# Patient Record
Sex: FEMALE | Race: WHITE | Employment: UNEMPLOYED | URBAN - METROPOLITAN AREA
[De-identification: names, ages, dates, MRNs, and addresses within clinical notes are randomized per-mention and may not be internally consistent; named-entity substitution may affect disease eponyms.]

---

## 2017-02-02 RX ORDER — ALBUTEROL SULFATE 90 UG/1
AEROSOL, METERED RESPIRATORY (INHALATION)
Qty: 18 INHALER | Refills: 2
Start: 2017-02-02

## 2017-02-09 RX ORDER — ALBUTEROL SULFATE 90 UG/1
AEROSOL, METERED RESPIRATORY (INHALATION)
Qty: 18 INHALER | Refills: 2 | Status: SHIPPED | OUTPATIENT
Start: 2017-02-09 | End: 2017-08-02 | Stop reason: SDUPTHER

## 2017-02-27 RX ORDER — CLONAZEPAM 0.5 MG/1
TABLET ORAL
Qty: 30 TAB | Refills: 0 | OUTPATIENT
Start: 2017-02-27 | End: 2017-03-28 | Stop reason: SDUPTHER

## 2017-02-27 NOTE — TELEPHONE ENCOUNTER
Patient is requesting a early refill because she is going out of town. [facility] would like a call to approve.

## 2017-02-28 ENCOUNTER — TELEPHONE (OUTPATIENT)
Dept: INTERNAL MEDICINE CLINIC | Age: 57
End: 2017-02-28

## 2017-03-28 RX ORDER — CLONAZEPAM 0.5 MG/1
TABLET ORAL
Qty: 30 TAB | Refills: 0 | OUTPATIENT
Start: 2017-03-28 | End: 2017-05-09 | Stop reason: SDUPTHER

## 2017-04-03 ENCOUNTER — OFFICE VISIT (OUTPATIENT)
Dept: INTERNAL MEDICINE CLINIC | Age: 57
End: 2017-04-03

## 2017-04-03 VITALS
BODY MASS INDEX: 27.16 KG/M2 | SYSTOLIC BLOOD PRESSURE: 144 MMHG | HEIGHT: 66 IN | RESPIRATION RATE: 18 BRPM | DIASTOLIC BLOOD PRESSURE: 78 MMHG | TEMPERATURE: 99 F | OXYGEN SATURATION: 99 % | WEIGHT: 169 LBS | HEART RATE: 57 BPM

## 2017-04-03 DIAGNOSIS — E03.9 ACQUIRED HYPOTHYROIDISM: ICD-10-CM

## 2017-04-03 DIAGNOSIS — F32.A ANXIETY AND DEPRESSION: Primary | ICD-10-CM

## 2017-04-03 DIAGNOSIS — R03.0 ELEVATED BP WITHOUT DIAGNOSIS OF HYPERTENSION: ICD-10-CM

## 2017-04-03 DIAGNOSIS — F41.9 ANXIETY AND DEPRESSION: Primary | ICD-10-CM

## 2017-04-03 DIAGNOSIS — F32.89 OTHER DEPRESSION: ICD-10-CM

## 2017-04-03 RX ORDER — HYDROCHLOROTHIAZIDE 25 MG/1
25 TABLET ORAL DAILY
Qty: 30 TAB | Refills: 1 | Status: SHIPPED | OUTPATIENT
Start: 2017-04-03 | End: 2017-05-09 | Stop reason: ALTCHOICE

## 2017-04-03 RX ORDER — ESCITALOPRAM OXALATE 10 MG/1
TABLET ORAL
Qty: 30 TAB | Refills: 5 | Status: SHIPPED | OUTPATIENT
Start: 2017-04-03 | End: 2017-05-09 | Stop reason: DRUGHIGH

## 2017-04-03 NOTE — PROGRESS NOTES
HISTORY OF PRESENT ILLNESS  Jes Arevalo is a 60 y.o. female. HPI  Follow up. Issues:  1. Anxiety/depression. She is supposed to be on Lexapro 10 mg a day, but she should have run out of pills in February, so it looks as if she is missing some. She's using Klonopin, on average four per night. She did not bring her bottle and is asked to do so in the future. Her  is reviewed and she has only filled meds from me and testosterone from [employer]. She goes through roughly 30 Klonopin every month and a half. 2. She is on a pork based thyroid supplement, but tells me it has not been checked recently. 3. I am concerned about her blood pressure as it is consistently running elevated in the diastolic and she describes some fluid retention, as well as tingling at times in right hand. No chest pain or shortness of breath. Review of Systems   Constitutional: Negative for chills, fever and weight loss. Respiratory: Negative for cough, shortness of breath and wheezing. Cardiovascular: Positive for leg swelling. Negative for chest pain, palpitations, orthopnea and PND. Gastrointestinal: Negative for heartburn and nausea. Musculoskeletal: Negative for myalgias. Neurological: Negative for dizziness and headaches. Psychiatric/Behavioral: Negative for depression. The patient has insomnia. The patient is not nervous/anxious. Physical Exam   Constitutional: She is oriented to person, place, and time. She appears well-developed and well-nourished. HENT:   Head: Normocephalic and atraumatic. Neck: Normal range of motion. Neck supple. Carotid bruit is not present. No thyromegaly present. Cardiovascular: Normal rate, regular rhythm, S1 normal, S2 normal, normal heart sounds and intact distal pulses. No murmur heard. Pulmonary/Chest: Effort normal and breath sounds normal. No respiratory distress. She has no wheezes. She has no rales. Musculoskeletal: She exhibits no edema. Neurological: She is alert and oriented to person, place, and time. Psychiatric: She has a normal mood and affect. Her behavior is normal.   Nursing note and vitals reviewed. ASSESSMENT and PLAN  Michael Ribera was seen today for medication evaluation. Diagnoses and all orders for this visit:    Anxiety and depression    Elevated BP without diagnosis of hypertension-start the hctz and appt in 1mo   Side effects possible reviewed in detail'  -     METABOLIC PANEL, COMPREHENSIVE    Other depression  -     escitalopram oxalate (LEXAPRO) 10 mg tablet; TAKE 1 TABLET BY MOUTH EVERY DAY-advised to take the lexapro qd    Acquired hypothyroidism  -     TSH 3RD GENERATION  -     T4, FREE  -     CBC WITH AUTOMATED DIFF    Other orders  -     hydroCHLOROthiazide (HYDRODIURIL) 25 mg tablet; Take 1 Tab by mouth daily.

## 2017-04-03 NOTE — MR AVS SNAPSHOT
Visit Information Date & Time Provider Department Dept. Phone Encounter #  
 4/3/2017  1:00 PM Juli Quintana MD [facility] [address] [id number] Upcoming Health Maintenance Date Due Hepatitis C Screening 1960 Pneumococcal 19-64 Medium Risk (1 of 1 - PPSV23) 12/11/1977 INFLUENZA AGE 9 TO ADULT 8/1/2016 PAP AKA CERVICAL CYTOLOGY 7/22/2017 BREAST CANCER SCRN MAMMOGRAM 9/27/2018 COLONOSCOPY 5/19/2019 DTaP/Tdap/Td series (2 - Td) 2/18/2026 Allergies as of 4/3/2017  Review Complete On: 4/3/2017 By: Juli Quintana MD  
  
 Severity Noted Reaction Type Reactions Pollen Extracts  08/07/2014    Sneezing Current Immunizations  Reviewed on 10/4/2016 Name Date Tdap 2/18/2016 Not reviewed this visit You Were Diagnosed With   
  
 Codes Comments Anxiety and depression    -  Primary ICD-10-CM: F41.9, F32.9 ICD-9-CM: 300.00, 311 Elevated BP without diagnosis of hypertension     ICD-10-CM: R03.0 ICD-9-CM: 796.2 Other depression     ICD-10-CM: F32.89 ICD-9-CM: 097 Acquired hypothyroidism     ICD-10-CM: E03.9 ICD-9-CM: 531. 9 Vitals BP Pulse Temp Resp Height(growth percentile) Weight(growth percentile) 144/78 (BP 1 Location: Left arm, BP Patient Position: Sitting) (!) 57 99 °F (37.2 °C) (Oral) 18 5' 6\" (1.676 m) 169 lb (76.7 kg) SpO2 BMI OB Status Smoking Status 99% 27.28 kg/m2 Menopause Never Smoker Vitals History BMI and BSA Data Body Mass Index Body Surface Area  
 27.28 kg/m 2 1.89 m 2 Preferred Pharmacy Pharmacy Name Phone [id number] [address], [address] [place] [place] [address]  [phone] Your Updated Medication List  
  
   
This list is accurate as of: 4/3/17  1:32 PM.  Always use your most recent med list.  
  
  
  
  
 clonazePAM 0.5 mg tablet Commonly known as:  KlonoPIN  
TAKE 1 TABLET BY MOUTH AT BEDTIME AS NEEDED  
  
 escitalopram oxalate 10 mg tablet Commonly known as:  Aliene Apo TAKE 1 TABLET BY MOUTH EVERY DAY  
  
 fexofenadine 180 mg tablet Commonly known as:  Girdler Fears Take 1 Tab by mouth daily. Indications: ALLERGIC CONJUNCTIVITIS  
  
 fluticasone-salmeterol 250-50 mcg/dose diskus inhaler Commonly known as:  ADVAIR Take 1 Puff by inhalation two (2) times a day. hydroCHLOROthiazide 25 mg tablet Commonly known as:  HYDRODIURIL Take 1 Tab by mouth daily. metFORMIN  mg tablet Commonly known as:  GLUCOPHAGE XR Take 1,000 mg by mouth two (2) times a day. montelukast 10 mg tablet Commonly known as:  SINGULAIR  
TAKE 1 TABLET BY MOUTH AT BEDTIME  
  
 OTHER  
biest (estriol/estradial 80/20%) progesterone 100 mg capsule Commonly known as:  PROMETRIUM Take 100 mg by mouth daily. Progesterone/pregnenolone E4M 100/50mg TESTOSTERONE CREAM  
Apply  to affected area. Testosterone Cream 1.25mg  
  
 thyroid (Pork) 32.5 mg tablet Commonly known as:  NATURE-THROID/WESTHROID Take  by mouth daily. VENTOLIN HFA 90 mcg/actuation inhaler Generic drug:  albuterol TAKE 1 TO 2 PUFFS EVERY 4 TO 6 HOURS AS NEEDED Prescriptions Sent to Pharmacy Refills  
 hydroCHLOROthiazide (HYDRODIURIL) 25 mg tablet 1 Sig: Take 1 Tab by mouth daily. Class: Normal  
 Pharmacy: [facility] #[id number]- [address] Ph #: [phone] Route: Oral  
 escitalopram oxalate (LEXAPRO) 10 mg tablet 5 Sig: TAKE 1 TABLET BY MOUTH EVERY DAY Class: Normal  
 Pharmacy: [facility]/pharm[facility] #[id number]- [address] Ph #: [phone] We Performed the Following CBC WITH AUTOMATED DIFF [53246 CPT(R)] METABOLIC PANEL, COMPREHENSIVE [07088 CPT(R)] T4, FREE [id number] CPT(R)] TSH 3RD GENERATION [14010 CPT(R)] Introducing [facility]! Dear Marichuy Bond: Thank you for requesting a [employer] account.   Our records indicate that you already have an active [employer] account. You can access your account anytime at [email or web address] Did you know that you can access your hospital and ER discharge instructions at any time in [employer]? You can also review all of your test results from your hospital stay or ER visit. Additional Information If you have questions, please visit the Frequently Asked Questions section of the [employer] website at [email or web address] Remember, [employer] is NOT to be used for urgent needs. For medical emergencies, dial 911. Now available from your iPhone and Android! Please provide this summary of care documentation to your next provider. Your primary care clinician is listed as Suzi Depmsey. If you have any questions after today's visit, please call [phone].

## 2017-04-04 LAB
ALBUMIN SERPL-MCNC: 4.4 G/DL (ref 3.5–5.5)
ALBUMIN/GLOB SERPL: 2.1 {RATIO} (ref 1.2–2.2)
ALP SERPL-CCNC: 33 IU/L (ref 39–117)
ALT SERPL-CCNC: 13 IU/L (ref 0–32)
AST SERPL-CCNC: 17 IU/L (ref 0–40)
BASOPHILS # BLD AUTO: 0 X10E3/UL (ref 0–0.2)
BASOPHILS NFR BLD AUTO: 1 %
BILIRUB SERPL-MCNC: 0.4 MG/DL (ref 0–1.2)
BUN SERPL-MCNC: 18 MG/DL (ref 6–24)
BUN/CREAT SERPL: 31 (ref 9–23)
CALCIUM SERPL-MCNC: 9.9 MG/DL (ref 8.7–10.2)
CHLORIDE SERPL-SCNC: 99 MMOL/L (ref 96–106)
CO2 SERPL-SCNC: 26 MMOL/L (ref 18–29)
CREAT SERPL-MCNC: 0.59 MG/DL (ref 0.57–1)
EOSINOPHIL # BLD AUTO: 0.1 X10E3/UL (ref 0–0.4)
EOSINOPHIL NFR BLD AUTO: 2 %
ERYTHROCYTE [DISTWIDTH] IN BLOOD BY AUTOMATED COUNT: 14 % (ref 12.3–15.4)
GLOBULIN SER CALC-MCNC: 2.1 G/DL (ref 1.5–4.5)
GLUCOSE SERPL-MCNC: 88 MG/DL (ref 65–99)
HCT VFR BLD AUTO: 39.6 % (ref 34–46.6)
HGB BLD-MCNC: 13.2 G/DL (ref 11.1–15.9)
IMM GRANULOCYTES # BLD: 0 X10E3/UL (ref 0–0.1)
IMM GRANULOCYTES NFR BLD: 0 %
LYMPHOCYTES # BLD AUTO: 1.6 X10E3/UL (ref 0.7–3.1)
LYMPHOCYTES NFR BLD AUTO: 28 %
MCH RBC QN AUTO: 31.3 PG (ref 26.6–33)
MCHC RBC AUTO-ENTMCNC: 33.3 G/DL (ref 31.5–35.7)
MCV RBC AUTO: 94 FL (ref 79–97)
MONOCYTES # BLD AUTO: 0.4 X10E3/UL (ref 0.1–0.9)
MONOCYTES NFR BLD AUTO: 8 %
NEUTROPHILS # BLD AUTO: 3.4 X10E3/UL (ref 1.4–7)
NEUTROPHILS NFR BLD AUTO: 61 %
PLATELET # BLD AUTO: 262 X10E3/UL (ref 150–379)
POTASSIUM SERPL-SCNC: 4.7 MMOL/L (ref 3.5–5.2)
PROT SERPL-MCNC: 6.5 G/DL (ref 6–8.5)
RBC # BLD AUTO: 4.22 X10E6/UL (ref 3.77–5.28)
SODIUM SERPL-SCNC: 140 MMOL/L (ref 134–144)
T4 FREE SERPL-MCNC: 0.82 NG/DL (ref 0.82–1.77)
TSH SERPL DL<=0.005 MIU/L-ACNC: 0.59 UIU/ML (ref 0.45–4.5)
WBC # BLD AUTO: 5.5 X10E3/UL (ref 3.4–10.8)

## 2017-04-08 RX ORDER — ESCITALOPRAM OXALATE 10 MG/1
TABLET ORAL
Qty: 30 TAB | Refills: 5 | Status: SHIPPED | OUTPATIENT
Start: 2017-04-08 | End: 2018-02-06 | Stop reason: ALTCHOICE

## 2017-05-09 ENCOUNTER — OFFICE VISIT (OUTPATIENT)
Dept: INTERNAL MEDICINE CLINIC | Age: 57
End: 2017-05-09

## 2017-05-09 VITALS
WEIGHT: 168.8 LBS | HEIGHT: 66 IN | HEART RATE: 60 BPM | TEMPERATURE: 99.2 F | BODY MASS INDEX: 27.13 KG/M2 | OXYGEN SATURATION: 98 % | DIASTOLIC BLOOD PRESSURE: 84 MMHG | SYSTOLIC BLOOD PRESSURE: 149 MMHG | RESPIRATION RATE: 16 BRPM

## 2017-05-09 DIAGNOSIS — G25.81 RLS (RESTLESS LEGS SYNDROME): ICD-10-CM

## 2017-05-09 DIAGNOSIS — F32.A ANXIETY AND DEPRESSION: ICD-10-CM

## 2017-05-09 DIAGNOSIS — F41.9 ANXIETY AND DEPRESSION: ICD-10-CM

## 2017-05-09 DIAGNOSIS — I10 HTN, GOAL BELOW 130/80: Primary | ICD-10-CM

## 2017-05-09 DIAGNOSIS — E04.0 GOITER DIFFUSE: ICD-10-CM

## 2017-05-09 RX ORDER — CLONAZEPAM 0.5 MG/1
TABLET ORAL
Qty: 30 TAB | Refills: 1 | Status: SHIPPED | OUTPATIENT
Start: 2017-05-09 | End: 2017-07-14 | Stop reason: SDUPTHER

## 2017-05-09 RX ORDER — ESCITALOPRAM OXALATE 20 MG/1
20 TABLET ORAL DAILY
Qty: 30 TAB | Refills: 2 | Status: SHIPPED | OUTPATIENT
Start: 2017-05-09 | End: 2017-07-14 | Stop reason: SDUPTHER

## 2017-05-09 RX ORDER — VALSARTAN AND HYDROCHLOROTHIAZIDE 80; 12.5 MG/1; MG/1
1 TABLET, FILM COATED ORAL DAILY
Qty: 30 TAB | Refills: 5 | Status: SHIPPED | OUTPATIENT
Start: 2017-05-09 | End: 2017-10-21 | Stop reason: SDUPTHER

## 2017-05-09 RX ORDER — VALACYCLOVIR HYDROCHLORIDE 1 G/1
1 TABLET, FILM COATED ORAL AS NEEDED
Refills: 6 | COMMUNITY
Start: 2017-04-07

## 2017-05-09 NOTE — MR AVS SNAPSHOT
Visit Information Date & Time Provider Department Dept. Phone Encounter #  
 5/9/2017  1:15 PM Aureliano Earl MD [facility] [address] [id number] Upcoming Health Maintenance Date Due Hepatitis C Screening 1960 Pneumococcal 19-64 Medium Risk (1 of 1 - PPSV23) 12/11/1977 PAP AKA CERVICAL CYTOLOGY 7/22/2017 INFLUENZA AGE 9 TO ADULT 8/1/2017 BREAST CANCER SCRN MAMMOGRAM 9/27/2018 COLONOSCOPY 5/19/2019 DTaP/Tdap/Td series (2 - Td) 2/18/2026 Allergies as of 5/9/2017  Review Complete On: 5/9/2017 By: Aureliano Earl MD  
  
 Severity Noted Reaction Type Reactions Pollen Extracts  08/07/2014    Sneezing Current Immunizations  Reviewed on 10/4/2016 Name Date Tdap 2/18/2016 Not reviewed this visit You Were Diagnosed With   
  
 Codes Comments HTN, goal below 130/80    -  Primary ICD-10-CM: I10 
ICD-9-CM: 401.9 Anxiety and depression     ICD-10-CM: F41.9, F32.9 ICD-9-CM: 300.00, 311 RLS (restless legs syndrome)     ICD-10-CM: G25.81 ICD-9-CM: 333.94 Vitals BP Pulse Temp Resp Height(growth percentile) Weight(growth percentile) 149/84 (BP 1 Location: Left arm, BP Patient Position: Sitting) 60 99.2 °F (37.3 °C) (Oral) 16 5' 6\" (1.676 m) 168 lb 12.8 oz (76.6 kg) SpO2 BMI OB Status Smoking Status 98% 27.25 kg/m2 Menopause Never Smoker Vitals History BMI and BSA Data Body Mass Index Body Surface Area  
 27.25 kg/m 2 1.89 m 2 Preferred Pharmacy Pharmacy Name Phone [id number] [address] , [address] [place] [address]  [phone] Your Updated Medication List  
  
   
This list is accurate as of: 5/9/17  1:42 PM.  Always use your most recent med list.  
  
  
  
  
 clonazePAM 0.5 mg tablet Commonly known as:  KlonoPIN  
TAKE 1 TABLET BY MOUTH AT BEDTIME AS NEEDED * escitalopram oxalate 10 mg tablet Commonly known as:  Zack Castellanos TAKE 1 TABLET BY MOUTH EVERY DAY **MAKE APPT  
  
 * escitalopram oxalate 20 mg tablet Commonly known as:  Cintia Peak Take 1 Tab by mouth daily. fexofenadine 180 mg tablet Commonly known as:  Jannet Velasquez Take 1 Tab by mouth daily. Indications: ALLERGIC CONJUNCTIVITIS  
  
 fluticasone-salmeterol 250-50 mcg/dose diskus inhaler Commonly known as:  ADVAIR Take 1 Puff by inhalation two (2) times a day. metFORMIN  mg tablet Commonly known as:  GLUCOPHAGE XR Take 1,000 mg by mouth two (2) times a day. montelukast 10 mg tablet Commonly known as:  SINGULAIR  
TAKE 1 TABLET BY MOUTH AT BEDTIME  
  
 OTHER  
biest (estriol/estradial 80/20%) progesterone 100 mg capsule Commonly known as:  PROMETRIUM Take 100 mg by mouth daily. Progesterone/pregnenolone E4M 100/50mg TESTOSTERONE CREAM  
Apply  to affected area. Testosterone Cream 1.25mg  
  
 thyroid (Pork) 32.5 mg tablet Commonly known as:  NATURE-THROID/WESTHROID Take  by mouth daily. valACYclovir 1 gram tablet Commonly known as:  VALTREX Take 1 Tab by mouth as needed. Used for cold sores. valsartan-hydroCHLOROthiazide 80-12.5 mg per tablet Commonly known as:  DIOVAN-HCT Take 1 Tab by mouth daily. VENTOLIN HFA 90 mcg/actuation inhaler Generic drug:  albuterol TAKE 1 TO 2 PUFFS EVERY 4 TO 6 HOURS AS NEEDED * Notice: This list has 2 medication(s) that are the same as other medications prescribed for you. Read the directions carefully, and ask your doctor or other care provider to review them with you. Prescriptions Printed Refills  
 clonazePAM (KLONOPIN) 0.5 mg tablet 1 Sig: TAKE 1 TABLET BY MOUTH AT BEDTIME AS NEEDED Class: Print Prescriptions Sent to Pharmacy Refills  
 escitalopram oxalate (LEXAPRO) 20 mg tablet 2 Sig: Take 1 Tab by mouth daily.   
 Class: Normal  
 Pharmacy: [facility] #[id number]- [address] Ph #: [phone] Route: Oral  
 valsartan-hydroCHLOROthiazide (DIOVAN-HCT) 80-12.5 mg per tablet 5 Sig: Take 1 Tab by mouth daily. Class: Normal  
 Pharmacy: [facility]/pharmacy #[id number]- [address], [address] [place] [address] [address] #: [phone] Route: Oral  
  
Introducing [facility]! Dear Negrita Moreno: Thank you for requesting a [employer] account. Our records indicate that you already have an active [employer] account. You can access your account anytime at [email or web address] Did you know that you can access your hospital and ER discharge instructions at any time in [employer]? You can also review all of your test results from your hospital stay or ER visit. Additional Information If you have questions, please visit the Frequently Asked Questions section of the [employer] website at [email or web address] Remember, [employer] is NOT to be used for urgent needs. For medical emergencies, dial 911. Now available from your iPhone and Android! Please provide this summary of care documentation to your next provider. Your primary care clinician is listed as Suzi 68. If you have any questions after today's visit, please call [phone].

## 2017-05-09 NOTE — PROGRESS NOTES
HISTORY OF PRESENT ILLNESS  Lee Madrid is a 60 y.o. female. HPI  Saul Best is seen at follow up. Issues:  1. Hypertension. Tolerates HCTZ, but blood pressure is still running high. Will change to Diovan/HCTZ. 2. Anxiety with some restless legs at night. Using Klonopin. Last prescription lasted six weeks of the 0.5. We are going to bump Lexapro to 20 to see if she can continue to taper Klonopin. 3. Thyroid goiter. No trouble with swallowing or hoarse voice. Thyroid function was normal.  Will check an ultrasound. Review of Systems   Constitutional: Negative for chills, fever and weight loss. Respiratory: Negative for cough, shortness of breath and wheezing. Cardiovascular: Negative for chest pain, palpitations, orthopnea, leg swelling and PND. Gastrointestinal: Negative for abdominal pain, constipation, heartburn and nausea. Musculoskeletal: Negative for myalgias. Neurological: Negative for dizziness and headaches. Physical Exam   Constitutional: She is oriented to person, place, and time. She appears well-developed and well-nourished. HENT:   Head: Normocephalic and atraumatic. Neck: Normal range of motion. Neck supple. Carotid bruit is not present. Thyromegaly present. Cardiovascular: Normal rate, regular rhythm, S1 normal, S2 normal, normal heart sounds and intact distal pulses. No murmur heard. Pulmonary/Chest: Effort normal and breath sounds normal. No respiratory distress. She has no wheezes. She has no rales. Musculoskeletal: She exhibits no edema. Neurological: She is alert and oriented to person, place, and time. Psychiatric: She has a normal mood and affect. Her behavior is normal.   Nursing note and vitals reviewed. ASSESSMENT and PLAN  Saul Best was seen today for results. Diagnoses and all orders for this visit:    HTN, goal below 130/80  -     valsartan-hydroCHLOROthiazide (DIOVAN-HCT) 80-12.5 mg per tablet;  Take 1 Tab by mouth daily. Anxiety and depression  -     escitalopram oxalate (LEXAPRO) 20 mg tablet; Take 1 Tab by mouth daily.     RLS (restless legs syndrome)    Goiter diffuse  -     US THYROID/PARATHYROID/SOFT TISS; Future    Other orders  -     clonazePAM (KLONOPIN) 0.5 mg tablet; TAKE 1 TABLET BY MOUTH AT BEDTIME AS NEEDED      the following changes in treatment are made: change to diovan hctz for bp and inc lexapro to 20 mg and aptp in 2.5 mo   reviewed

## 2017-05-28 RX ORDER — HYDROCHLOROTHIAZIDE 25 MG/1
TABLET ORAL
Qty: 30 TAB | Refills: 1 | OUTPATIENT
Start: 2017-05-28

## 2017-05-31 RX ORDER — HYDROCHLOROTHIAZIDE 25 MG/1
TABLET ORAL
Qty: 30 TAB | Refills: 5 | OUTPATIENT
Start: 2017-05-31

## 2017-07-12 NOTE — TELEPHONE ENCOUNTER
She has made appointment to see Dr Navneet Mayo for 07/14/2017 and she needs a refill on her  clonazePAM (KLONOPIN) 0.5 mg tablet  [facility]  [phone] her no is [phone].  She is out of her medication

## 2017-07-14 ENCOUNTER — OFFICE VISIT (OUTPATIENT)
Dept: INTERNAL MEDICINE CLINIC | Age: 57
End: 2017-07-14

## 2017-07-14 VITALS
RESPIRATION RATE: 16 BRPM | DIASTOLIC BLOOD PRESSURE: 69 MMHG | HEIGHT: 66 IN | TEMPERATURE: 97.7 F | WEIGHT: 169 LBS | HEART RATE: 64 BPM | OXYGEN SATURATION: 98 % | BODY MASS INDEX: 27.16 KG/M2 | SYSTOLIC BLOOD PRESSURE: 121 MMHG

## 2017-07-14 DIAGNOSIS — F32.89 OTHER DEPRESSION: ICD-10-CM

## 2017-07-14 DIAGNOSIS — F41.9 ANXIETY AND DEPRESSION: ICD-10-CM

## 2017-07-14 DIAGNOSIS — I10 HTN, GOAL BELOW 130/80: Primary | ICD-10-CM

## 2017-07-14 DIAGNOSIS — F32.A ANXIETY AND DEPRESSION: ICD-10-CM

## 2017-07-14 DIAGNOSIS — E04.9 THYROID GOITER: ICD-10-CM

## 2017-07-14 RX ORDER — CLONAZEPAM 0.5 MG/1
TABLET ORAL
Qty: 30 TAB | Refills: 0 | Status: CANCELLED | OUTPATIENT
Start: 2017-07-14

## 2017-07-14 RX ORDER — ESCITALOPRAM OXALATE 20 MG/1
20 TABLET ORAL DAILY
Qty: 30 TAB | Refills: 2 | Status: SHIPPED | OUTPATIENT
Start: 2017-07-14 | End: 2017-12-06 | Stop reason: SDUPTHER

## 2017-07-14 RX ORDER — CLONAZEPAM 0.5 MG/1
TABLET ORAL
Qty: 30 TAB | Refills: 2 | Status: SHIPPED | OUTPATIENT
Start: 2017-08-12 | End: 2017-11-28 | Stop reason: SDUPTHER

## 2017-07-14 NOTE — PROGRESS NOTES
Chief Complaint   Patient presents with    Medication Refill       1. Have you been to the ER, urgent care clinic since your last visit? Hospitalized since your last visit? No    2. Have you seen or consulted any other health care providers outside of the [address] [place] [address] since your last visit? Include any pap smears or colon screening.  No

## 2017-07-14 NOTE — PROGRESS NOTES
HISTORY OF PRESENT ILLNESS  Jillian Hardwick is a 60 y.o. female. HPI  Follow up:  1. Hypertension. Changed from Valsartan to Valsartan/HCTZ, which she tolerates well and blood pressure looks great. No chest pain, no cramps. Recently went to [place]. 2. Depression, tolerating the increased dose of Lexapro 20. She's using Klonopin 0.5 at night.  is reviewed. Last filled July 12th. She brings her bottle in. There is no evidence of misuse or overuse. 3. Thyroid enlargement. She is on a nature thyroid replacement. She is not having trouble swallowing or having hoarse voice. She never went for her ultrasound. Review of Systems   Constitutional: Negative for chills, fever, malaise/fatigue and weight loss. Respiratory: Negative for cough, shortness of breath and wheezing. Cardiovascular: Negative for chest pain, palpitations, orthopnea, leg swelling and PND. Gastrointestinal: Negative for heartburn and nausea. Musculoskeletal: Negative for myalgias. Neurological: Negative for dizziness and headaches. Psychiatric/Behavioral: Negative for depression and substance abuse. The patient is not nervous/anxious. Physical Exam   Constitutional: She is oriented to person, place, and time. She appears well-developed and well-nourished. HENT:   Head: Normocephalic and atraumatic. Neck: Normal range of motion. Neck supple. Carotid bruit is not present. Thyromegaly present. Cardiovascular: Normal rate, regular rhythm, S1 normal, S2 normal, normal heart sounds and intact distal pulses. No murmur heard. Pulmonary/Chest: Effort normal and breath sounds normal. No respiratory distress. She has no wheezes. She has no rales. Musculoskeletal: She exhibits no edema. Neurological: She is alert and oriented to person, place, and time. Psychiatric: She has a normal mood and affect. Her behavior is normal.   Nursing note and vitals reviewed.       ASSESSMENT and PLAN  Virginia Pablo was seen today for medication refill. Diagnoses and all orders for this visit:    HTN, goal below 130/80  Improved cont diovan hctz  appt in 3mo  Other depression    Anxiety and depression  -     escitalopram oxalate (LEXAPRO) 20 mg tablet; Take 1 Tab by mouth daily.   -     clonazePAM (KLONOPIN) 0.5 mg tablet; TAKE 1 TABLET BY MOUTH AT BEDTIME AS NEEDED    Thyroid goiter  -     US THYROID/PARATHYROID/SOFT TISS; Future

## 2017-07-23 RX ORDER — ALBUTEROL SULFATE 90 UG/1
AEROSOL, METERED RESPIRATORY (INHALATION)
Qty: 18 INHALER | Refills: 2
Start: 2017-07-23

## 2017-07-31 DIAGNOSIS — F41.9 ANXIETY AND DEPRESSION: ICD-10-CM

## 2017-07-31 DIAGNOSIS — F32.A ANXIETY AND DEPRESSION: ICD-10-CM

## 2017-07-31 RX ORDER — ESCITALOPRAM OXALATE 20 MG/1
TABLET ORAL
Qty: 30 TAB | Refills: 2 | Status: SHIPPED | OUTPATIENT
Start: 2017-07-31 | End: 2018-02-27 | Stop reason: SDUPTHER

## 2017-08-02 RX ORDER — ALBUTEROL SULFATE 90 UG/1
AEROSOL, METERED RESPIRATORY (INHALATION)
Qty: 18 INHALER | Refills: 2 | Status: SHIPPED | OUTPATIENT
Start: 2017-08-02 | End: 2018-02-23 | Stop reason: SDUPTHER

## 2017-08-22 ENCOUNTER — HOSPITAL ENCOUNTER (OUTPATIENT)
Dept: ULTRASOUND IMAGING | Age: 57
Discharge: HOME | End: 2017-08-22
Attending: INTERNAL MEDICINE
Payer: COMMERCIAL

## 2017-08-22 DIAGNOSIS — E04.9 THYROID GOITER: ICD-10-CM

## 2017-08-22 PROCEDURE — 76536 US EXAM OF HEAD AND NECK: CPT

## 2017-10-21 DIAGNOSIS — I10 HTN, GOAL BELOW 130/80: ICD-10-CM

## 2017-10-21 RX ORDER — VALSARTAN AND HYDROCHLOROTHIAZIDE 80; 12.5 MG/1; MG/1
TABLET, FILM COATED ORAL
Qty: 30 TAB | Refills: 5 | Status: SHIPPED | OUTPATIENT
Start: 2017-10-21 | End: 2018-02-27 | Stop reason: SDUPTHER

## 2017-11-28 DIAGNOSIS — F41.9 ANXIETY AND DEPRESSION: ICD-10-CM

## 2017-11-28 DIAGNOSIS — F32.A ANXIETY AND DEPRESSION: ICD-10-CM

## 2017-11-28 RX ORDER — CLONAZEPAM 0.5 MG/1
TABLET ORAL
Qty: 30 TAB | Refills: 0 | OUTPATIENT
Start: 2017-11-28 | End: 2017-12-15 | Stop reason: SDUPTHER

## 2017-12-06 DIAGNOSIS — F41.9 ANXIETY AND DEPRESSION: ICD-10-CM

## 2017-12-06 DIAGNOSIS — F32.A ANXIETY AND DEPRESSION: ICD-10-CM

## 2017-12-06 RX ORDER — ESCITALOPRAM OXALATE 20 MG/1
TABLET ORAL
Qty: 30 TAB | Refills: 2 | Status: SHIPPED | OUTPATIENT
Start: 2017-12-06 | End: 2017-12-15 | Stop reason: SDUPTHER

## 2017-12-15 ENCOUNTER — OFFICE VISIT (OUTPATIENT)
Dept: INTERNAL MEDICINE CLINIC | Age: 57
End: 2017-12-15

## 2017-12-15 ENCOUNTER — HOSPITAL ENCOUNTER (OUTPATIENT)
Dept: GENERAL RADIOLOGY | Age: 57
Discharge: HOME | End: 2017-12-15
Attending: INTERNAL MEDICINE
Payer: COMMERCIAL

## 2017-12-15 VITALS
SYSTOLIC BLOOD PRESSURE: 124 MMHG | RESPIRATION RATE: 16 BRPM | TEMPERATURE: 98.9 F | BODY MASS INDEX: 27.8 KG/M2 | OXYGEN SATURATION: 98 % | WEIGHT: 173 LBS | HEIGHT: 66 IN | DIASTOLIC BLOOD PRESSURE: 74 MMHG | HEART RATE: 65 BPM

## 2017-12-15 DIAGNOSIS — M79.671 RIGHT FOOT PAIN: Primary | ICD-10-CM

## 2017-12-15 DIAGNOSIS — E03.9 ACQUIRED HYPOTHYROIDISM: ICD-10-CM

## 2017-12-15 DIAGNOSIS — F41.9 ANXIETY AND DEPRESSION: ICD-10-CM

## 2017-12-15 DIAGNOSIS — M79.671 RIGHT FOOT PAIN: ICD-10-CM

## 2017-12-15 DIAGNOSIS — F32.A ANXIETY AND DEPRESSION: ICD-10-CM

## 2017-12-15 DIAGNOSIS — N39.0 URINARY TRACT INFECTION WITHOUT HEMATURIA, SITE UNSPECIFIED: ICD-10-CM

## 2017-12-15 PROCEDURE — 73630 X-RAY EXAM OF FOOT: CPT

## 2017-12-15 RX ORDER — SULFAMETHOXAZOLE AND TRIMETHOPRIM 800; 160 MG/1; MG/1
1 TABLET ORAL 2 TIMES DAILY
Qty: 6 TAB | Refills: 0 | Status: SHIPPED | OUTPATIENT
Start: 2017-12-15 | End: 2017-12-28 | Stop reason: ALTCHOICE

## 2017-12-15 RX ORDER — CLONAZEPAM 0.5 MG/1
TABLET ORAL
Qty: 30 TAB | Refills: 1 | Status: SHIPPED | OUTPATIENT
Start: 2017-12-15 | End: 2018-02-27 | Stop reason: SDUPTHER

## 2017-12-15 RX ORDER — PHENAZOPYRIDINE HYDROCHLORIDE 200 MG/1
200 TABLET, FILM COATED ORAL
Qty: 12 TAB | Refills: 1 | Status: SHIPPED | OUTPATIENT
Start: 2017-12-15 | End: 2019-05-23 | Stop reason: SDUPTHER

## 2017-12-15 NOTE — PROGRESS NOTES
HISTORY OF PRESENT ILLNESS  Ford Elliott is a 59 y.o. female. HPI  Manisha Or has several concerns:  1. She twisted her right foot while in [place] in [place]. Xray there was negative, however continues to have swelling and pain in middle part of her foot. 2. UTI symptoms off and on for the last week with dysuria and urgency. No flank pain or fevers or chills. Will do a urine culture and she wants to start antibiotics while waiting on culture. 3. Anxiety. Brings in [place]. She filled 30 tablets on Novembver 28th and has used 21 of the tablets. Will refill this.  reviewed. She has been getting testosterone micronized powder from Dr. Jaison Muhammad. No other controlled substances recently. 4. Energy is good. She is on a topical estrogen and progesterone supplement from Dr. Ace Lyles and he is following levels for her. She is using it for libido and weight. Review of Systems   Constitutional: Negative for chills, fever and weight loss. Respiratory: Negative for cough, shortness of breath and wheezing. Cardiovascular: Negative for chest pain, palpitations, orthopnea, leg swelling and PND. Gastrointestinal: Negative for abdominal pain, constipation, heartburn, nausea and vomiting. Genitourinary: Positive for dysuria, frequency and urgency. Negative for flank pain and hematuria. Musculoskeletal: Positive for falls and joint pain (right foot). Negative for myalgias. Neurological: Negative for dizziness and headaches. Psychiatric/Behavioral: Negative for depression. Physical Exam   Constitutional: She is oriented to person, place, and time. She appears well-developed and well-nourished. HENT:   Head: Normocephalic and atraumatic. Neck: Normal range of motion. Neck supple. Carotid bruit is not present. No thyromegaly present. Cardiovascular: Normal rate, regular rhythm, S1 normal, S2 normal, normal heart sounds and intact distal pulses. No murmur heard.   Pulmonary/Chest: Effort normal and breath sounds normal. No respiratory distress. She has no wheezes. She has no rales. Abdominal: Soft. There is no tenderness. Musculoskeletal: She exhibits no edema. Mid right  and mildly swollen   Neurological: She is alert and oriented to person, place, and time. Psychiatric: She has a normal mood and affect. Her behavior is normal.   Nursing note and vitals reviewed. ASSESSMENT and PLAN  Diagnoses and all orders for this visit:    1. Right foot pain  -     XR FOOT RT MIN 3 V; Future    2. Urinary tract infection without hematuria, site unspecified  -     trimethoprim-sulfamethoxazole (BACTRIM DS, SEPTRA DS) 160-800 mg per tablet; Take 1 Tab by mouth two (2) times a day. -     CULTURE, URINE  -     phenazopyridine (PYRIDIUM) 200 mg tablet; Take 1 Tab by mouth three (3) times daily as needed for Pain for up to 3 days. 3. Acquired hypothyroidism  -     METABOLIC PANEL, COMPREHENSIVE  -     TSH 3RD GENERATION  -     T4, FREE    4.  Anxiety and depression  -     clonazePAM (KLONOPIN) 0.5 mg tablet; TAKE 1 TABLET BY MOUTH AT BEDTIME AS NEEDED     reviewed

## 2017-12-15 NOTE — MR AVS SNAPSHOT
Visit Information Date & Time Provider Department Dept. Phone Encounter #  
 12/15/2017 11:30 AM Zachary Hernandez MD [facility] [address] [id number] Upcoming Health Maintenance Date Due Hepatitis C Screening 1960 Pneumococcal 19-64 Medium Risk (1 of 1 - PPSV23) 12/11/1977 PAP AKA CERVICAL CYTOLOGY 7/22/2017 Influenza Age 5 to Adult 8/1/2017 COLONOSCOPY 5/19/2019 DTaP/Tdap/Td series (2 - Td) 2/18/2026 Allergies as of 12/15/2017  Review Complete On: 12/15/2017 By: Elizabet Davenport LPN Severity Noted Reaction Type Reactions Pollen Extracts  08/07/2014    Sneezing Current Immunizations  Reviewed on 10/4/2016 Name Date Tdap 2/18/2016 Not reviewed this visit You Were Diagnosed With   
  
 Codes Comments Right foot pain    -  Primary ICD-10-CM: I11.353 ICD-9-CM: 729.5 Urinary tract infection without hematuria, site unspecified     ICD-10-CM: N39.0 ICD-9-CM: 599.0 Acquired hypothyroidism     ICD-10-CM: E03.9 ICD-9-CM: 244.9 Anxiety and depression     ICD-10-CM: F41.8 ICD-9-CM: 300.00, 311 Vitals BP Pulse Temp Resp Height(growth percentile) Weight(growth percentile) 124/74 (BP 1 Location: Right arm, BP Patient Position: Sitting) 65 98.9 °F (37.2 °C) (Oral) 16 5' 6\" (1.676 m) 173 lb (78.5 kg) SpO2 BMI OB Status Smoking Status 98% 27.92 kg/m2 Menopause Never Smoker Vitals History BMI and BSA Data Body Mass Index Body Surface Area  
 27.92 kg/m 2 1.91 m 2 Preferred Pharmacy Pharmacy Name Phone [id number] [address], [address] [place] [address]  [phone] Your Updated Medication List  
  
   
This list is accurate as of: 12/15/17 11:53 AM.  Always use your most recent med list.  
  
  
  
  
 clonazePAM 0.5 mg tablet Commonly known as:  KlonoPIN  
TAKE 1 TABLET BY MOUTH AT BEDTIME AS NEEDED  
  
 * escitalopram oxalate 10 mg tablet Commonly known as:  Skip  TAKE 1 TABLET BY MOUTH EVERY DAY **MAKE APPT  
  
 * escitalopram oxalate 20 mg tablet Commonly known as:  Skip  TAKE 1 TABLET BY MOUTH DAILY  
  
 fexofenadine 180 mg tablet Commonly known as:  Lu Berger Take 1 Tab by mouth daily. Indications: ALLERGIC CONJUNCTIVITIS  
  
 fluticasone-salmeterol 250-50 mcg/dose diskus inhaler Commonly known as:  ADVAIR Take 1 Puff by inhalation two (2) times a day. metFORMIN  mg tablet Commonly known as:  GLUCOPHAGE XR Take 1,000 mg by mouth two (2) times a day. montelukast 10 mg tablet Commonly known as:  SINGULAIR  
TAKE 1 TABLET BY MOUTH AT BEDTIME  
  
 OTHER  
biest (estriol/estradial 80/20%) phenazopyridine 200 mg tablet Commonly known as:  PYRIDIUM Take 1 Tab by mouth three (3) times daily as needed for Pain for up to 3 days. progesterone 100 mg capsule Commonly known as:  PROMETRIUM Take 100 mg by mouth daily. Progesterone/pregnenolone E4M 100/50mg TESTOSTERONE CREAM  
Apply  to affected area. Testosterone Cream 1.25mg  
  
 thyroid (Pork) 32.5 mg tablet Commonly known as:  NATURE-THROID/WESTHROID Take  by mouth daily. trimethoprim-sulfamethoxazole 160-800 mg per tablet Commonly known as:  BACTRIM DS, SEPTRA DS Take 1 Tab by mouth two (2) times a day. valACYclovir 1 gram tablet Commonly known as:  VALTREX Take 1 Tab by mouth as needed. Used for cold sores. valsartan-hydroCHLOROthiazide 80-12.5 mg per tablet Commonly known as:  DIOVAN-HCT  
TAKE 1 TAB BY MOUTH DAILY. VENTOLIN HFA 90 mcg/actuation inhaler Generic drug:  albuterol TAKE 1 TO 2 PUFFS EVERY 4 TO 6 HOURS AS NEEDED * Notice: This list has 2 medication(s) that are the same as other medications prescribed for you. Read the directions carefully, and ask your doctor or other care provider to review them with you. Prescriptions Printed Refills  
 clonazePAM (KLONOPIN) 0.5 mg tablet 1 Sig: TAKE 1 TABLET BY MOUTH AT BEDTIME AS NEEDED Class: Print Prescriptions Sent to Pharmacy Refills  
 trimethoprim-sulfamethoxazole (BACTRIM DS, SEPTRA DS) 160-800 mg per tablet 0 Sig: Take 1 Tab by mouth two (2) times a day. Class: Normal  
 Pharmacy: [id number]- [address] [facility], [address] [place] [address] Dr Ph #: [phone] Route: Oral  
 phenazopyridine (PYRIDIUM) 200 mg tablet 1 Sig: Take 1 Tab by mouth three (3) times daily as needed for Pain for up to 3 days. Class: Normal  
 Pharmacy: [facility] #[id number]- [address] [facility], [address] [place] [address] Dr Ph #: [phone] Route: Oral  
  
We Performed the Following CULTURE, URINE [id number] CPT(R)] METABOLIC PANEL, COMPREHENSIVE [16345 CPT(R)] T4, FREE [id number] CPT(R)] TSH 3RD GENERATION [83400 CPT(R)] To-Do List   
 12/15/2017 Imaging:  XR FOOT RT MIN 3 V Introducing [facility] & HEALTH SERVICES! Dear Billy Trevino: Thank you for requesting a Brand[employer] account. Our records indicate that you already have an active Brandwatch account. You can access your account anytime at [email or web address] Did you know that you can access your hospital and ER discharge instructions at any time in Brandwatch? You can also review all of your test results from your hospital stay or ER visit. Additional Information If you have questions, please visit the Frequently Asked Questions section of the Brandwatch website at [email or web address] Remember, Brandwatch is NOT to be used for urgent needs. For medical emergencies, dial 911. Now available from your iPhone and Android! Please provide this summary of care documentation to your next provider. Your primary care clinician is listed as Suzi Dempsey. If you have any questions after today's visit, please call [phone].

## 2017-12-16 LAB
ALBUMIN SERPL-MCNC: 4.4 G/DL (ref 3.5–5.5)
ALBUMIN/GLOB SERPL: 2.2 {RATIO} (ref 1.2–2.2)
ALP SERPL-CCNC: 42 IU/L (ref 39–117)
ALT SERPL-CCNC: 22 IU/L (ref 0–32)
AST SERPL-CCNC: 22 IU/L (ref 0–40)
BILIRUB SERPL-MCNC: 0.4 MG/DL (ref 0–1.2)
BUN SERPL-MCNC: 17 MG/DL (ref 6–24)
BUN/CREAT SERPL: 24 (ref 9–23)
CALCIUM SERPL-MCNC: 9.6 MG/DL (ref 8.7–10.2)
CHLORIDE SERPL-SCNC: 96 MMOL/L (ref 96–106)
CO2 SERPL-SCNC: 25 MMOL/L (ref 18–29)
CREAT SERPL-MCNC: 0.72 MG/DL (ref 0.57–1)
GFR SERPLBLD CREATININE-BSD FMLA CKD-EPI: 106 ML/MIN/1.73
GFR SERPLBLD CREATININE-BSD FMLA CKD-EPI: 92 ML/MIN/1.73
GLOBULIN SER CALC-MCNC: 2 G/DL (ref 1.5–4.5)
GLUCOSE SERPL-MCNC: 95 MG/DL (ref 65–99)
POTASSIUM SERPL-SCNC: 4.4 MMOL/L (ref 3.5–5.2)
PROT SERPL-MCNC: 6.4 G/DL (ref 6–8.5)
SODIUM SERPL-SCNC: 136 MMOL/L (ref 134–144)
T4 FREE SERPL-MCNC: 1.01 NG/DL (ref 0.82–1.77)
TSH SERPL DL<=0.005 MIU/L-ACNC: 0.99 UIU/ML (ref 0.45–4.5)

## 2017-12-17 NOTE — PROGRESS NOTES
Can you get ehr an appt with ortho for ?  Old fracture happened in oct-not sure what to do clinically at this point but still having pain and xray not normal -I want their opinion on rx at this point-dr iftikhar sellers or dr Georgette Sunshine would be good-or dr Ortiz Alberts from podiatry

## 2017-12-18 LAB — BACTERIA UR CULT: ABNORMAL

## 2017-12-19 NOTE — PROGRESS NOTES
Patient notified of x-ray results. Advised her notes have been sent to [facility] & they will be contacting her to schedule an appt.

## 2017-12-28 ENCOUNTER — OFFICE VISIT (OUTPATIENT)
Dept: INTERNAL MEDICINE CLINIC | Age: 57
End: 2017-12-28

## 2017-12-28 VITALS
OXYGEN SATURATION: 98 % | TEMPERATURE: 98.4 F | HEIGHT: 66 IN | RESPIRATION RATE: 12 BRPM | SYSTOLIC BLOOD PRESSURE: 122 MMHG | HEART RATE: 76 BPM | DIASTOLIC BLOOD PRESSURE: 66 MMHG | BODY MASS INDEX: 27.8 KG/M2 | WEIGHT: 173 LBS

## 2017-12-28 DIAGNOSIS — N30.00 ACUTE CYSTITIS WITHOUT HEMATURIA: ICD-10-CM

## 2017-12-28 DIAGNOSIS — N89.8 VAGINAL ITCHING: ICD-10-CM

## 2017-12-28 DIAGNOSIS — R30.9 URINARY PAIN: Primary | ICD-10-CM

## 2017-12-28 LAB
BILIRUB UR QL STRIP: ABNORMAL
GLUCOSE UR-MCNC: NEGATIVE MG/DL
KETONES P FAST UR STRIP-MCNC: ABNORMAL MG/DL
PH UR STRIP: 7.5 [PH] (ref 4.6–8)
PROT UR QL STRIP: ABNORMAL
SP GR UR STRIP: 1.01 (ref 1–1.03)
UA UROBILINOGEN AMB POC: ABNORMAL (ref 0.2–1)
URINALYSIS CLARITY POC: CLEAR
URINALYSIS COLOR POC: YELLOW
URINE BLOOD POC: NEGATIVE
URINE LEUKOCYTES POC: ABNORMAL
URINE NITRITES POC: NEGATIVE

## 2017-12-28 RX ORDER — FLUCONAZOLE 150 MG/1
150 TABLET ORAL DAILY
Qty: 2 TAB | Refills: 0 | Status: SHIPPED | OUTPATIENT
Start: 2017-12-28 | End: 2017-12-29

## 2017-12-28 RX ORDER — CIPROFLOXACIN 500 MG/1
500 TABLET ORAL 2 TIMES DAILY
Qty: 10 TAB | Refills: 0 | Status: SHIPPED | OUTPATIENT
Start: 2017-12-28 | End: 2018-02-06 | Stop reason: ALTCHOICE

## 2017-12-28 RX ORDER — DICLOFENAC SODIUM 75 MG/1
75 TABLET, DELAYED RELEASE ORAL
COMMUNITY
Start: 2017-12-28 | End: 2019-01-21 | Stop reason: ALTCHOICE

## 2017-12-28 NOTE — MR AVS SNAPSHOT
Visit Information Date & Time Provider Department Dept. Phone Encounter #  
 12/28/2017  2:40 PM Leigh Lucio NP [facility] [address] [id number] Upcoming Health Maintenance Date Due Hepatitis C Screening 1960 Pneumococcal 19-64 Medium Risk (1 of 1 - PPSV23) 12/11/1977 PAP AKA CERVICAL CYTOLOGY 7/22/2017 Influenza Age 5 to Adult 8/1/2017 COLONOSCOPY 5/19/2019 DTaP/Tdap/Td series (2 - Td) 2/18/2026 Allergies as of 12/28/2017  Review Complete On: 12/28/2017 By: Leigh Lucio NP Severity Noted Reaction Type Reactions Pollen Extracts  08/07/2014    Sneezing Current Immunizations  Reviewed on 10/4/2016 Name Date Tdap 2/18/2016 Not reviewed this visit You Were Diagnosed With   
  
 Codes Comments Urinary pain    -  Primary ICD-10-CM: R30.9 ICD-9-CM: 209. 1 Acute cystitis without hematuria     ICD-10-CM: N30.00 ICD-9-CM: 595.0 Vaginal itching     ICD-10-CM: L29.8 ICD-9-CM: 698.1 Vitals BP Pulse Temp Resp Height(growth percentile) Weight(growth percentile) 122/66 (BP 1 Location: Left arm, BP Patient Position: Sitting) 76 98.4 °F (36.9 °C) (Oral) 12 5' 6\" (1.676 m) 173 lb (78.5 kg) SpO2 BMI OB Status Smoking Status 98% 27.92 kg/m2 Menopause Never Smoker BMI and BSA Data Body Mass Index Body Surface Area  
 27.92 kg/m 2 1.91 m 2 Preferred Pharmacy Pharmacy Name Phone [id number] [address] [facility], [address] [place] [address] Dr [phone] Your Updated Medication List  
  
   
This list is accurate as of: 12/28/17  3:09 PM.  Always use your most recent med list.  
  
  
  
  
 ciprofloxacin HCl 500 mg tablet Commonly known as:  CIPRO Take 1 Tab by mouth two (2) times a day. clonazePAM 0.5 mg tablet Commonly known as:  KlonoPIN  
TAKE 1 TABLET BY MOUTH AT BEDTIME AS NEEDED  
  
 diclofenac EC 75 mg EC tablet Commonly known as:  VOLTAREN Take 75 mg by mouth. * escitalopram oxalate 10 mg tablet Commonly known as:  Skip  TAKE 1 TABLET BY MOUTH EVERY DAY **MAKE APPT  
  
 * escitalopram oxalate 20 mg tablet Commonly known as:  Skip  TAKE 1 TABLET BY MOUTH DAILY  
  
 fexofenadine 180 mg tablet Commonly known as:  Lu Berger Take 1 Tab by mouth daily. Indications: ALLERGIC CONJUNCTIVITIS  
  
 fluconazole 150 mg tablet Commonly known as:  DIFLUCAN Take 1 Tab by mouth daily for 1 day. May repeat in 1 week with 2nd tablet if needed  
  
 fluticasone-salmeterol 250-50 mcg/dose diskus inhaler Commonly known as:  ADVAIR Take 1 Puff by inhalation two (2) times a day. metFORMIN  mg tablet Commonly known as:  GLUCOPHAGE XR Take 1,000 mg by mouth two (2) times a day. montelukast 10 mg tablet Commonly known as:  SINGULAIR  
TAKE 1 TABLET BY MOUTH AT BEDTIME  
  
 OTHER  
biest (estriol/estradial 80/20%) progesterone 100 mg capsule Commonly known as:  PROMETRIUM Take 100 mg by mouth daily. Progesterone/pregnenolone E4M 100/50mg TESTOSTERONE CREAM  
Apply  to affected area. Testosterone Cream 1.25mg  
  
 thyroid (Pork) 32.5 mg tablet Commonly known as:  NATURE-THROID/WESTHROID Take  by mouth daily. valACYclovir 1 gram tablet Commonly known as:  VALTREX Take 1 Tab by mouth as needed. Used for cold sores. valsartan-hydroCHLOROthiazide 80-12.5 mg per tablet Commonly known as:  DIOVAN-HCT  
TAKE 1 TAB BY MOUTH DAILY. VENTOLIN HFA 90 mcg/actuation inhaler Generic drug:  albuterol TAKE 1 TO 2 PUFFS EVERY 4 TO 6 HOURS AS NEEDED * Notice: This list has 2 medication(s) that are the same as other medications prescribed for you. Read the directions carefully, and ask your doctor or other care provider to review them with you. Prescriptions Sent to Pharmacy  Refills  
 ciprofloxacin HCl (CIPRO) 500 mg tablet 0  
 Sig: Take 1 Tab by mouth two (2) times a day. Class: Normal  
 Pharmacy: [id number] [address] [facility], [address] [place] [address] Dr Ph #: [phone] Route: Oral  
 fluconazole (DIFLUCAN) 150 mg tablet 0 Sig: Take 1 Tab by mouth daily for 1 day. May repeat in 1 week with 2nd tablet if needed Class: Normal  
 Pharmacy: [id number] [address] [facility], [address] [place] [address] Dr Ph #: [phone] Route: Oral  
  
We Performed the Following AMB POC URINALYSIS DIP STICK AUTO W/O MICRO [21254 CPT(R)] CULTURE, URINE [id number] CPT(R)] NUSWAB VAGINITIS [[id number] Custom] Patient Instructions Urinary Tract Infection in Women: Care Instructions Your Care Instructions A urinary tract infection, or UTI, is a general term for an infection anywhere between the kidneys and the urethra (where urine comes out). Most UTIs are bladder infections. They often cause pain or burning when you urinate. UTIs are caused by bacteria and can be cured with antibiotics. Be sure to complete your treatment so that the infection goes away. Follow-up care is a key part of your treatment and safety. Be sure to make and go to all appointments, and call your doctor if you are having problems. It's also a good idea to know your test results and keep a list of the medicines you take. How can you care for yourself at home? · Take your antibiotics as directed. Do not stop taking them just because you feel better. You need to take the full course of antibiotics. · Drink extra water and other fluids for the next day or two. This may help wash out the bacteria that are causing the infection. (If you have kidney, heart, or liver disease and have to limit fluids, talk with your doctor before you increase your fluid intake.) · Avoid drinks that are carbonated or have caffeine. They can irritate the bladder. · Urinate often. Try to empty your bladder each time. · To relieve pain, take a hot bath or lay a heating pad set on low over your lower belly or genital area. Never go to sleep with a heating pad in place. To prevent UTIs · Drink plenty of water each day. This helps you urinate often, which clears bacteria from your system. (If you have kidney, heart, or liver disease and have to limit fluids, talk with your doctor before you increase your fluid intake.) · Urinate when you need to. · Urinate right after you have sex. · Change sanitary pads often. · Avoid douches, bubble baths, feminine hygiene sprays, and other feminine hygiene products that have deodorants. · After going to the bathroom, wipe from front to back. When should you call for help? Call your doctor now or seek immediate medical care if: 
? · Symptoms such as fever, chills, nausea, or vomiting get worse or appear for the first time. ? · You have new pain in your back just below your rib cage. This is called flank pain. ? · There is new blood or pus in your urine. ? · You have any problems with your antibiotic medicine. ? Watch closely for changes in your health, and be sure to contact your doctor if: 
? · You are not getting better after taking an antibiotic for 2 days. ? · Your symptoms go away but then come back. Where can you learn more? Go to http://rosaline-jared.info/. Enter M670 in the search box to learn more about \"Urinary Tract Infection in Women: Care Instructions. \" Current as of: May 12, 2017 Content Version: 11.4 © 7148-7290 Caption Data. Care instructions adapted under license by [employer] (which disclaims liability or warranty for this information). If you have questions about a medical condition or this instruction, always ask your healthcare professional. [address] any warranty or liability for your use of this information. Vaginitis: Care Instructions Your Care Instructions Vaginitis is soreness or infection of the vagina. This common problem can cause itching and burning. And it can cause a change in vaginal discharge. Sometimes it can cause pain during sex. Vaginitis may be caused by bacteria, yeast, or other germs. Some infections that cause it are caught from a sexual partner. Bath products, spermicides, and douches can irritate the vagina too. Some women have this problem during and after menopause. A drop in estrogen levels during this time can cause dryness, soreness, and pain during sex. Your doctor can give you medicine to treat an infection. And home care may help you feel better. For certain types of infections, your sex partner must be treated too. Follow-up care is a key part of your treatment and safety. Be sure to make and go to all appointments, and call your doctor if you are having problems. It's also a good idea to know your test results and keep a list of the medicines you take. How can you care for yourself at home? · If your doctor prescribed antibiotics, take them as directed. Do not stop taking them just because you feel better. You need to take the full course of antibiotics. · Take your medicines exactly as prescribed. Call your doctor if you think you are having a problem with your medicine. · Do not eat or drink anything that has alcohol if you are taking metronidazole (Flagyl). · If you have a yeast infection, use over-the-counter products as your doctor tells you to. Or take medicine your doctor prescribes exactly as directed. · Wash your vaginal area daily with water. You also can use a mild, unscented soap if you want. · Do not use scented bath products. And do not use vaginal sprays or douches. · Put a washcloth soaked in cool water on the area to relieve itching. Or you can take cool baths. · If you have dryness because of menopause, use estrogen cream or pills that your doctor prescribes. · Ask your doctor about when it is okay to have sex. · Use a personal lubricant before sex if you have dryness. Examples are Astroglide, K-Y Jelly, and Wet Lubricant Gel. · Ask your doctor if your sex partner also needs treatment. When should you call for help? Call your doctor now or seek immediate medical care if: 
? · You have a fever and pelvic pain. ? Watch closely for changes in your health, and be sure to contact your doctor if: 
? · You have bleeding other than your period. ? · You do not get better as expected. Where can you learn more? Go to http://rosaline-jared.info/. Enter T043 in the search box to learn more about \"Vaginitis: Care Instructions. \" Current as of: October 13, 2016 Content Version: 11.4 © 4707-6433 Verient. Care instructions adapted under license by FindThatCourse (which disclaims liability or warranty for this information). If you have questions about a medical condition or this instruction, always ask your healthcare professional. [place] [address] any warranty or liability for your use of this information. Introducing [facility]! Dear Kailash Sam: Thank you for requesting a [employer] account. Our records indicate that you already have an active [employer] account. You can access your account anytime at [email or web address] Did you know that you can access your hospital and ER discharge instructions at any time in [employer]? You can also review all of your test results from your hospital stay or ER visit. Additional Information If you have questions, please visit the Frequently Asked Questions section of the [employer] website at [email or web address] Remember, [employer] is NOT to be used for urgent needs. For medical emergencies, dial 911. Now available from your iPhone and Android! Please provide this summary of care documentation to your next provider. Your primary care clinician is listed as Suzi Dempsey. If you have any questions after today's visit, please call [phone].

## 2017-12-28 NOTE — PATIENT INSTRUCTIONS
Urinary Tract Infection in Women: Care Instructions  Your Care Instructions    A urinary tract infection, or UTI, is a general term for an infection anywhere between the kidneys and the urethra (where urine comes out). Most UTIs are bladder infections. They often cause pain or burning when you urinate. UTIs are caused by bacteria and can be cured with antibiotics. Be sure to complete your treatment so that the infection goes away. Follow-up care is a key part of your treatment and safety. Be sure to make and go to all appointments, and call your doctor if you are having problems. It's also a good idea to know your test results and keep a list of the medicines you take. How can you care for yourself at home? · Take your antibiotics as directed. Do not stop taking them just because you feel better. You need to take the full course of antibiotics. · Drink extra water and other fluids for the next day or two. This may help wash out the bacteria that are causing the infection. (If you have kidney, heart, or liver disease and have to limit fluids, talk with your doctor before you increase your fluid intake.)  · Avoid drinks that are carbonated or have caffeine. They can irritate the bladder. · Urinate often. Try to empty your bladder each time. · To relieve pain, take a hot bath or lay a heating pad set on low over your lower belly or genital area. Never go to sleep with a heating pad in place. To prevent UTIs  · Drink plenty of water each day. This helps you urinate often, which clears bacteria from your system. (If you have kidney, heart, or liver disease and have to limit fluids, talk with your doctor before you increase your fluid intake.)  · Urinate when you need to. · Urinate right after you have sex. · Change sanitary pads often. · Avoid douches, bubble baths, feminine hygiene sprays, and other feminine hygiene products that have deodorants.   · After going to the bathroom, wipe from front to back.  When should you call for help? Call your doctor now or seek immediate medical care if:  ? · Symptoms such as fever, chills, nausea, or vomiting get worse or appear for the first time. ? · You have new pain in your back just below your rib cage. This is called flank pain. ? · There is new blood or pus in your urine. ? · You have any problems with your antibiotic medicine. ? Watch closely for changes in your health, and be sure to contact your doctor if:  ? · You are not getting better after taking an antibiotic for 2 days. ? · Your symptoms go away but then come back. Where can you learn more? Go to http://rosaline-jared.info/. Enter L104 in the search box to learn more about \"Urinary Tract Infection in Women: Care Instructions. \"  Current as of: May 12, 2017  Content Version: 11.4  © 7541-5067 [employer]. Care instructions adapted under license by Bevvy (which disclaims liability or warranty for this information). If you have questions about a medical condition or this instruction, always ask your healthcare professional. [address] any warranty or liability for your use of this information. Vaginitis: Care Instructions  Your Care Instructions    Vaginitis is soreness or infection of the vagina. This common problem can cause itching and burning. And it can cause a change in vaginal discharge. Sometimes it can cause pain during sex. Vaginitis may be caused by bacteria, yeast, or other germs. Some infections that cause it are caught from a sexual partner. Bath products, spermicides, and douches can irritate the vagina too. Some women have this problem during and after menopause. A drop in estrogen levels during this time can cause dryness, soreness, and pain during sex. Your doctor can give you medicine to treat an infection. And home care may help you feel better.  For certain types of infections, your sex partner must be treated too. Follow-up care is a key part of your treatment and safety. Be sure to make and go to all appointments, and call your doctor if you are having problems. It's also a good idea to know your test results and keep a list of the medicines you take. How can you care for yourself at home? · If your doctor prescribed antibiotics, take them as directed. Do not stop taking them just because you feel better. You need to take the full course of antibiotics. · Take your medicines exactly as prescribed. Call your doctor if you think you are having a problem with your medicine. · Do not eat or drink anything that has alcohol if you are taking metronidazole (Flagyl). · If you have a yeast infection, use over-the-counter products as your doctor tells you to. Or take medicine your doctor prescribes exactly as directed. · Wash your vaginal area daily with water. You also can use a mild, unscented soap if you want. · Do not use scented bath products. And do not use vaginal sprays or douches. · Put a washcloth soaked in cool water on the area to relieve itching. Or you can take cool baths. · If you have dryness because of menopause, use estrogen cream or pills that your doctor prescribes. · Ask your doctor about when it is okay to have sex. · Use a personal lubricant before sex if you have dryness. Examples are Astroglide, K-Y Jelly, and Wet Lubricant Gel. · Ask your doctor if your sex partner also needs treatment. When should you call for help? Call your doctor now or seek immediate medical care if:  ? · You have a fever and pelvic pain. ? Watch closely for changes in your health, and be sure to contact your doctor if:  ? · You have bleeding other than your period. ? · You do not get better as expected. Where can you learn more? Go to http://rosaline-jared.info/. Enter P188 in the search box to learn more about \"Vaginitis: Care Instructions. \"  Current as of: October 13, 2016  Content Version: 11.4  © 9309-9287 [employer]. Care instructions adapted under license by [employer]node (which disclaims liability or warranty for this information). If you have questions about a medical condition or this instruction, always ask your healthcare professional. [address] any warranty or liability for your use of this information.

## 2017-12-28 NOTE — PROGRESS NOTES
HISTORY OF PRESENT ILLNESS  Aleksandr Galarza is a 59 y.o. female. HPI  Patient of Dr Saman Benedict who presents with complaints of continued urinary symptoms and lower pelvic pressure and vaginal irritation. She was seen on 12/15 and treated with 3 day course of Bactrim for possible UTI; urine culture was positive for 50-100K Enterobacter aerogenes which was sensitive to Bactrim. Felt like symptoms improved initially but then started to develop external vaginal irritation and itching and now urinary symptoms are returning. Took Probiotics while on Bactrim and used 1 day dose of Monistat OTC but felt like vaginal tissues burned more after using this. Denies fever, flank pain, hematuria. Review of Systems   Constitutional: Negative for chills, fever and malaise/fatigue. HENT: Negative for congestion and hearing loss. Respiratory: Negative for cough. Cardiovascular: Negative for chest pain and palpitations. Gastrointestinal: Positive for abdominal pain. Negative for constipation, diarrhea, nausea and vomiting. Genitourinary: Positive for dysuria, frequency and urgency. Negative for flank pain and hematuria. Musculoskeletal: Negative for myalgias. Skin: Positive for itching. Negative for rash. Neurological: Negative for dizziness and headaches. Physical Exam   Constitutional: She is oriented to person, place, and time. She appears well-developed and well-nourished. HENT:   Head: Normocephalic and atraumatic. Cardiovascular: Normal rate and regular rhythm. Pulmonary/Chest: Effort normal and breath sounds normal.   Abdominal: Soft. Bowel sounds are normal. There is tenderness in the suprapubic area. There is no rebound and no guarding. Genitourinary: There is erythema in the vagina. Vaginal discharge (thick white) found. Neurological: She is alert and oriented to person, place, and time. Psychiatric: She has a normal mood and affect.  Her behavior is normal.   Nursing note and vitals reviewed. Urine dip positive for 1+ bilirubin, trace ketones, 2+ protein, 1+ leukocytes      ASSESSMENT and PLAN  Diagnoses and all orders for this visit:    1. Urinary pain  -     AMB POC URINALYSIS DIP STICK AUTO W/O MICRO    2. Acute cystitis without hematuria -- will send urine culture but suspect UTI has not resolved completely. -     ciprofloxacin HCl (CIPRO) 500 mg tablet; Take 1 Tab by mouth two (2) times a day. -     CULTURE, URINE    3. Vaginal itching  -     fluconazole (DIFLUCAN) 150 mg tablet; Take 1 Tab by mouth daily for 1 day.  May repeat in 1 week with 2nd tablet if needed  -     [address] [place] [address]      lab results and schedule of future lab studies reviewed with patient  reviewed diet, exercise and weight control  reviewed medications and side effects in detail

## 2017-12-30 LAB — BACTERIA UR CULT: NO GROWTH

## 2017-12-31 LAB
A VAGINAE DNA VAG QL NAA+PROBE: ABNORMAL SCORE
BVAB2 DNA VAG QL NAA+PROBE: ABNORMAL SCORE
C ALBICANS DNA VAG QL NAA+PROBE: POSITIVE
C GLABRATA DNA VAG QL NAA+PROBE: NEGATIVE
MEGA1 DNA VAG QL NAA+PROBE: ABNORMAL SCORE
T VAGINALIS RRNA SPEC QL NAA+PROBE: NEGATIVE

## 2018-02-06 ENCOUNTER — OFFICE VISIT (OUTPATIENT)
Dept: INTERNAL MEDICINE CLINIC | Age: 58
End: 2018-02-06

## 2018-02-06 VITALS
BODY MASS INDEX: 27.64 KG/M2 | OXYGEN SATURATION: 98 % | DIASTOLIC BLOOD PRESSURE: 59 MMHG | SYSTOLIC BLOOD PRESSURE: 132 MMHG | HEIGHT: 66 IN | WEIGHT: 172 LBS | RESPIRATION RATE: 16 BRPM | HEART RATE: 67 BPM | TEMPERATURE: 98.5 F

## 2018-02-06 DIAGNOSIS — N39.0 RECURRENT UTI: ICD-10-CM

## 2018-02-06 DIAGNOSIS — N39.0 URINARY TRACT INFECTION WITHOUT HEMATURIA, SITE UNSPECIFIED: Primary | ICD-10-CM

## 2018-02-06 DIAGNOSIS — R30.9 URINARY PAIN: ICD-10-CM

## 2018-02-06 DIAGNOSIS — Z91.09 ENVIRONMENTAL ALLERGIES: ICD-10-CM

## 2018-02-06 LAB
BILIRUB UR QL STRIP: NEGATIVE
GLUCOSE UR-MCNC: NORMAL MG/DL
KETONES P FAST UR STRIP-MCNC: NEGATIVE MG/DL
PH UR STRIP: 7 [PH] (ref 4.6–8)
PROT UR QL STRIP: NORMAL
SP GR UR STRIP: 1.01 (ref 1–1.03)
UA UROBILINOGEN AMB POC: NORMAL (ref 0.2–1)
URINALYSIS CLARITY POC: NORMAL
URINALYSIS COLOR POC: NORMAL
URINE BLOOD POC: NORMAL
URINE LEUKOCYTES POC: NORMAL
URINE NITRITES POC: POSITIVE

## 2018-02-06 RX ORDER — MONTELUKAST SODIUM 10 MG/1
10 TABLET ORAL DAILY
Qty: 30 TAB | Refills: 5 | Status: SHIPPED | OUTPATIENT
Start: 2018-02-06 | End: 2018-02-27 | Stop reason: SDUPTHER

## 2018-02-06 RX ORDER — CIPROFLOXACIN 250 MG/1
250 TABLET, FILM COATED ORAL 2 TIMES DAILY
Qty: 14 TAB | Refills: 0 | Status: SHIPPED | OUTPATIENT
Start: 2018-02-06 | End: 2018-02-27 | Stop reason: ALTCHOICE

## 2018-02-06 RX ORDER — FLUCONAZOLE 150 MG/1
150 TABLET ORAL DAILY
Qty: 2 TAB | Refills: 0 | Status: SHIPPED | OUTPATIENT
Start: 2018-02-06 | End: 2018-02-21 | Stop reason: SDUPTHER

## 2018-02-06 RX ORDER — FLUTICASONE PROPIONATE AND SALMETEROL 250; 50 UG/1; UG/1
1 POWDER RESPIRATORY (INHALATION) 2 TIMES DAILY
Qty: 1 INHALER | Refills: 5 | Status: SHIPPED | OUTPATIENT
Start: 2018-02-06 | End: 2018-04-25 | Stop reason: ALTCHOICE

## 2018-02-06 RX ORDER — SULFAMETHOXAZOLE AND TRIMETHOPRIM 400; 80 MG/1; MG/1
1 TABLET ORAL
Qty: 25 TAB | Refills: 1 | Status: SHIPPED | OUTPATIENT
Start: 2018-02-06 | End: 2018-07-17 | Stop reason: SDUPTHER

## 2018-02-06 NOTE — MR AVS SNAPSHOT
[address] [facility] 
 
 
 [address] [place] [address] [facility] 
[phone] Patient: Nico Chavez MRN: [id number] FIF:[id number] Visit Information Date & Time Provider Department Dept. Phone Encounter #  
 2/6/2018 11:30 AM Charlotte Guerrier MD [facility] [address] [id number] Upcoming Health Maintenance Date Due Hepatitis C Screening 1960 Pneumococcal 19-64 Medium Risk (1 of 1 - PPSV23) 12/11/1977 PAP AKA CERVICAL CYTOLOGY 7/22/2017 Influenza Age 5 to Adult 8/1/2017 BREAST CANCER SCRN MAMMOGRAM 9/27/2018 COLONOSCOPY 5/19/2019 DTaP/Tdap/Td series (2 - Td) 2/18/2026 Allergies as of 2/6/2018  Review Complete On: 2/6/2018 By: Charlotte Guerrier MD  
  
 Severity Noted Reaction Type Reactions Pollen Extracts  08/07/2014    Sneezing Current Immunizations  Reviewed on 10/4/2016 Name Date Tdap 2/18/2016 Not reviewed this visit You Were Diagnosed With   
  
 Codes Comments Urinary tract infection without hematuria, site unspecified    -  Primary ICD-10-CM: N39.0 ICD-9-CM: 599.0 Urinary pain     ICD-10-CM: R30.9 ICD-9-CM: 788.1 Recurrent UTI     ICD-10-CM: N39.0 ICD-9-CM: 599.0 Vitals BP Pulse Temp Resp Height(growth percentile) Weight(growth percentile) 132/59 (BP 1 Location: Left arm, BP Patient Position: Sitting) 67 98.5 °F (36.9 °C) (Oral) 16 5' 6\" (1.676 m) 172 lb (78 kg) SpO2 BMI OB Status Smoking Status 98% 27.76 kg/m2 Menopause Never Smoker Vitals History BMI and BSA Data Body Mass Index Body Surface Area  
 27.76 kg/m 2 1.91 m 2 Preferred Pharmacy Pharmacy Name Phone [id number] [address] , [address] [place] [address]  [phone] Your Updated Medication List  
  
   
This list is accurate as of: 2/6/18 12:00 PM.  Always use your most recent med list.  
  
  
  
  
 ciprofloxacin HCl 250 mg tablet Commonly known as:  CIPRO Take 1 Tab by mouth two (2) times a day. clonazePAM 0.5 mg tablet Commonly known as:  KlonoPIN  
TAKE 1 TABLET BY MOUTH AT BEDTIME AS NEEDED  
  
 diclofenac EC 75 mg EC tablet Commonly known as:  VOLTAREN Take 75 mg by mouth.  
  
 escitalopram oxalate 20 mg tablet Commonly known as:  Digna Miguelangel TAKE 1 TABLET BY MOUTH DAILY  
  
 fexofenadine 180 mg tablet Commonly known as:  [place] Teri Take 1 Tab by mouth daily. Indications: ALLERGIC CONJUNCTIVITIS  
  
 fluconazole 150 mg tablet Commonly known as:  DIFLUCAN Take 1 Tab by mouth daily for 1 day. FDA advises cautious prescribing of oral fluconazole in pregnancy. fluticasone-salmeterol 250-50 mcg/dose diskus inhaler Commonly known as:  ADVAIR Take 1 Puff by inhalation two (2) times a day. metFORMIN  mg tablet Commonly known as:  GLUCOPHAGE XR Take 1,000 mg by mouth two (2) times a day. montelukast 10 mg tablet Commonly known as:  SINGULAIR  
TAKE 1 TABLET BY MOUTH AT BEDTIME  
  
 OTHER  
biest (estriol/estradial 80/20%) progesterone 100 mg capsule Commonly known as:  PROMETRIUM Take 100 mg by mouth daily. Progesterone/pregnenolone E4M 100/50mg TESTOSTERONE CREAM  
Apply  to affected area. Testosterone Cream 1.25mg  
  
 thyroid (Pork) 32.5 mg tablet Commonly known as:  NATURE-THROID/WESTHROID Take  by mouth daily. trimethoprim-sulfamethoxazole  mg per tablet Commonly known as:  Anival Plate Take 1 Tab by mouth daily as needed. 1 every day prn  
  
 valACYclovir 1 gram tablet Commonly known as:  VALTREX Take 1 Tab by mouth as needed. Used for cold sores. valsartan-hydroCHLOROthiazide 80-12.5 mg per tablet Commonly known as:  DIOVAN-HCT  
TAKE 1 TAB BY MOUTH DAILY. VENTOLIN HFA 90 mcg/actuation inhaler Generic drug:  albuterol TAKE 1 TO 2 PUFFS EVERY 4 TO 6 HOURS AS NEEDED  
  
  
  
  
 Prescriptions Sent to Pharmacy Refills  
 ciprofloxacin HCl (CIPRO) 250 mg tablet 0 Sig: Take 1 Tab by mouth two (2) times a day. Class: Normal  
 Pharmacy: [id number] [address], [address] [place] [address] Dr Ph #: [phone] Route: Oral  
 fluconazole (DIFLUCAN) 150 mg tablet 0 Sig: Take 1 Tab by mouth daily for 1 day. FDA advises cautious prescribing of oral fluconazole in pregnancy. Class: Normal  
 Pharmacy: [facility]/pharm[facility] #[id number] [address], [address] [place] [address] Dr Ph #: [phone] Route: Oral  
 trimethoprim-sulfamethoxazole (BACTRIM, SEPTRA)  mg per tablet 1 Sig: Take 1 Tab by mouth daily as needed. 1 every day prn  
 Class: Normal  
 Pharmacy: [facility] [id number], [address] [place] [address] Dr Ph #: [phone] Route: Oral  
  
We Performed the Following AMB POC URINALYSIS DIP STICK AUTO W/O MICRO [81339 CPT(R)] CULTURE, URINE [id number] CPT(R)] Introducing [address]! Dear Lisa Owenstor: Thank you for requesting a Gravity account. Our records indicate that you already have an active Gravity account. You can access your account anytime at [email or web address] Did you know that you can access your hospital and ER discharge instructions at any time in Gravity? You can also review all of your test results from your hospital stay or ER visit. Additional Information If you have questions, please visit the Frequently Asked Questions section of the Gravity website at [email or web address] Remember, Gravity is NOT to be used for urgent needs. For medical emergencies, dial 911. Now available from your iPhone and Android! Please provide this summary of care documentation to your next provider. Your primary care clinician is listed as Suzi Dempsey. If you have any questions after today's visit, please call [phone].

## 2018-02-06 NOTE — PROGRESS NOTES
HISTORY OF PRESENT ILLNESS  Sherron Hendrickson is a 59 y.o. female. HPI  Having UTI symptoms four days ago. She notes she's more sexually active with a new partner and this may be triggering it. Her urine culture in December was no growth, but she has been seen every month for the last three months with UTI symptoms. She's not having hematuria. She does describe lower abdominal pressure, discomfort, urgency, low back pain. No fevers. Asthma. She had been on Singulair in the past and had done well. She'd like to resume . She has recently had some sinus pressure and congestion. She also needs a refill on Advair. Review of Systems   Constitutional: Positive for malaise/fatigue. Negative for chills and fever. HENT: Positive for congestion. Respiratory: Positive for cough and wheezing. Negative for sputum production. Gastrointestinal: Negative for abdominal pain, nausea and vomiting. Genitourinary: Positive for dysuria, frequency and urgency. Negative for flank pain and hematuria. Physical Exam   Constitutional: She is oriented to person, place, and time. She appears well-developed and well-nourished. HENT:   Head: Normocephalic and atraumatic. Right Ear: Tympanic membrane, external ear and ear canal normal.   Left Ear: Tympanic membrane, external ear and ear canal normal.   Nose: Nose normal.   Mouth/Throat: Oropharynx is clear and moist and mucous membranes are normal. No oropharyngeal exudate. Eyes: Conjunctivae are normal. Pupils are equal, round, and reactive to light. Right eye exhibits no discharge. Left eye exhibits no discharge. Neck: Normal range of motion. Neck supple. Carotid bruit is not present. Cardiovascular: Normal rate, regular rhythm, S1 normal, S2 normal, normal heart sounds and intact distal pulses. No murmur heard. Pulmonary/Chest: Effort normal and breath sounds normal. No respiratory distress. She has no wheezes. She has no rales. Abdominal: Soft. Bowel sounds are normal. She exhibits no mass. There is no tenderness. No cvat   Musculoskeletal: She exhibits no edema. Lymphadenopathy:     She has no cervical adenopathy. Neurological: She is alert and oriented to person, place, and time. Psychiatric: She has a normal mood and affect. Her behavior is normal.   Nursing note and vitals reviewed. ASSESSMENT and PLAN  Diagnoses and all orders for this visit:    1. Urinary tract infection without hematuria, site unspecified    2. Urinary pain  -     AMB POC URINALYSIS DIP STICK AUTO W/O MICRO  -     CULTURE, URINE    3. Recurrent UTI  -     ciprofloxacin HCl (CIPRO) 250 mg tablet; Take 1 Tab by mouth two (2) times a day. -     fluconazole (DIFLUCAN) 150 mg tablet; Take 1 Tab by mouth daily for 1 day. FDA advises cautious prescribing of oral fluconazole in pregnancy. -     trimethoprim-sulfamethoxazole (BACTRIM, SEPTRA)  mg per tablet; Take 1 Tab by mouth daily as needed. 1 every day prn post coital to suppress    4. Environmental allergies    5. Moderate intermittent asthma without complication  -     fluticasone-salmeterol (ADVAIR) 250-50 mcg/dose diskus inhaler; Take 1 Puff by inhalation two (2) times a day. -     montelukast (SINGULAIR) 10 mg tablet; Take 1 Tab by mouth daily.

## 2018-02-08 LAB — BACTERIA UR CULT: NO GROWTH

## 2018-02-21 DIAGNOSIS — N39.0 RECURRENT UTI: ICD-10-CM

## 2018-02-21 RX ORDER — FLUCONAZOLE 150 MG/1
TABLET ORAL
Qty: 2 TAB | Refills: 0 | Status: SHIPPED | OUTPATIENT
Start: 2018-02-21 | End: 2018-04-25 | Stop reason: ALTCHOICE

## 2018-02-24 RX ORDER — ALBUTEROL SULFATE 90 UG/1
AEROSOL, METERED RESPIRATORY (INHALATION)
Qty: 18 INHALER | Refills: 2 | Status: SHIPPED | OUTPATIENT
Start: 2018-02-24 | End: 2018-08-21 | Stop reason: SDUPTHER

## 2018-02-27 ENCOUNTER — OFFICE VISIT (OUTPATIENT)
Dept: INTERNAL MEDICINE CLINIC | Age: 58
End: 2018-02-27

## 2018-02-27 VITALS
DIASTOLIC BLOOD PRESSURE: 72 MMHG | TEMPERATURE: 98.5 F | RESPIRATION RATE: 16 BRPM | SYSTOLIC BLOOD PRESSURE: 124 MMHG | BODY MASS INDEX: 27.48 KG/M2 | HEART RATE: 63 BPM | HEIGHT: 66 IN | WEIGHT: 171 LBS | OXYGEN SATURATION: 97 %

## 2018-02-27 DIAGNOSIS — F41.9 ANXIETY AND DEPRESSION: Primary | ICD-10-CM

## 2018-02-27 DIAGNOSIS — I10 HTN, GOAL BELOW 130/80: ICD-10-CM

## 2018-02-27 DIAGNOSIS — R63.5 WEIGHT GAIN: ICD-10-CM

## 2018-02-27 DIAGNOSIS — F32.A ANXIETY AND DEPRESSION: Primary | ICD-10-CM

## 2018-02-27 DIAGNOSIS — G25.81 RESTLESS LEG SYNDROME: ICD-10-CM

## 2018-02-27 RX ORDER — VALSARTAN AND HYDROCHLOROTHIAZIDE 80; 12.5 MG/1; MG/1
TABLET, FILM COATED ORAL
Qty: 30 TAB | Refills: 5 | Status: SHIPPED | OUTPATIENT
Start: 2018-02-27

## 2018-02-27 RX ORDER — ESCITALOPRAM OXALATE 20 MG/1
TABLET ORAL
Qty: 30 TAB | Refills: 5 | Status: SHIPPED | OUTPATIENT
Start: 2018-02-27 | End: 2018-04-25 | Stop reason: SDUPTHER

## 2018-02-27 RX ORDER — CLONAZEPAM 0.5 MG/1
TABLET ORAL
Qty: 30 TAB | Refills: 1 | Status: SHIPPED | OUTPATIENT
Start: 2018-02-27 | End: 2018-05-29 | Stop reason: SDUPTHER

## 2018-02-27 NOTE — MR AVS SNAPSHOT
[address] [facility] 
 
 
 [address] [place] [address] [facility] 
[phone] Patient: Rasheed Gant MRN: [id number] ZFV:[id number] Visit Information Date & Time Provider Department Dept. Phone Encounter #  
 2/27/2018  3:30 PM Cecille Garcia MD [facility] [address]  [id number] Upcoming Health Maintenance Date Due Hepatitis C Screening 1960 Pneumococcal 19-64 Medium Risk (1 of 1 - PPSV23) 12/11/1977 PAP AKA CERVICAL CYTOLOGY 7/22/2017 Influenza Age 5 to Adult 8/1/2017 BREAST CANCER SCRN MAMMOGRAM 9/27/2018 COLONOSCOPY 5/19/2019 DTaP/Tdap/Td series (2 - Td) 2/18/2026 Allergies as of 2/27/2018  Review Complete On: 2/27/2018 By: Cecille Garcia MD  
  
 Severity Noted Reaction Type Reactions Pollen Extracts  08/07/2014    Sneezing Current Immunizations  Reviewed on 10/4/2016 Name Date Tdap 2/18/2016 Not reviewed this visit You Were Diagnosed With   
  
 Codes Comments Anxiety and depression    -  Primary ICD-10-CM: F41.8 ICD-9-CM: 300.00, 311 HTN, goal below 130/80     ICD-10-CM: I10 
ICD-9-CM: 401.9 Weight gain     ICD-10-CM: R63.5 ICD-9-CM: 783.1 Restless leg syndrome     ICD-10-CM: G25.81 ICD-9-CM: 333.94 Vitals BP Pulse Temp Resp Height(growth percentile) Weight(growth percentile) 124/72 (BP 1 Location: Left arm, BP Patient Position: Sitting) 63 98.5 °F (36.9 °C) (Oral) 16 5' 6\" (1.676 m) 171 lb (77.6 kg) SpO2 BMI OB Status Smoking Status 97% 27.6 kg/m2 Menopause Never Smoker Vitals History BMI and BSA Data Body Mass Index Body Surface Area  
 27.6 kg/m 2 1.9 m 2 Preferred Pharmacy Pharmacy Name Phone [id number] [address] , [address] [place] [address]  [phone] Your Updated Medication List  
  
   
This list is accurate as of 2/27/18  3:52 PM.  Always use your most recent med list.  
  
  
  
  
 clonazePAM 0.5 mg tablet Commonly known as:  KlonoPIN  
TAKE 1 TABLET BY MOUTH AT BEDTIME AS NEEDED  
  
 diclofenac EC 75 mg EC tablet Commonly known as:  VOLTAREN Take 75 mg by mouth.  
  
 escitalopram oxalate 20 mg tablet Commonly known as:  [place]ola  TAKE 1 TABLET BY MOUTH DAILY  
  
 fexofenadine 180 mg tablet Commonly known as:  Louvenia Bolognese Take 1 Tab by mouth daily. Indications: ALLERGIC CONJUNCTIVITIS  
  
 fluconazole 150 mg tablet Commonly known as:  DIFLUCAN  
TAKE 1 TABLET BY MOUTH DAILY FOR 1 DAY  
  
 fluticasone-salmeterol 250-50 mcg/dose diskus inhaler Commonly known as:  ADVAIR Take 1 Puff by inhalation two (2) times a day. montelukast 10 mg tablet Commonly known as:  SINGULAIR  
TAKE 1 TABLET BY MOUTH AT BEDTIME  
  
 OTHER  
biest (estriol/estradial 80/20%) progesterone 100 mg capsule Commonly known as:  PROMETRIUM Take 100 mg by mouth daily. Progesterone/pregnenolone E4M 100/50mg TESTOSTERONE CREAM  
Apply  to affected area. Testosterone Cream 1.25mg  
  
 thyroid (Pork) 32.5 mg tablet Commonly known as:  NATURE-THROID/WESTHROID Take  by mouth daily. trimethoprim-sulfamethoxazole  mg per tablet Commonly known as:  Jing Brocks Take 1 Tab by mouth daily as needed. 1 every day prn  
  
 valACYclovir 1 gram tablet Commonly known as:  VALTREX Take 1 Tab by mouth as needed. Used for cold sores. valsartan-hydroCHLOROthiazide 80-12.5 mg per tablet Commonly known as:  DIOVAN-HCT  
TAKE 1 TAB BY MOUTH DAILY. VENTOLIN HFA 90 mcg/actuation inhaler Generic drug:  albuterol TAKE 1 TO 2 PUFFS EVERY 4 TO 6 HOURS AS NEEDED Prescriptions Printed Refills  
 clonazePAM (KLONOPIN) 0.5 mg tablet 1 Sig: TAKE 1 TABLET BY MOUTH AT BEDTIME AS NEEDED Class: Print Prescriptions Sent to Pharmacy  Refills  
 escitalopram oxalate (LEXAPRO) 20 mg tablet 5  
 Sig: TAKE 1 TABLET BY MOUTH DAILY Class: Normal  
 [id number] [address] Ph #: [phone]  
 valsartan-hydroCHLOROthiazide (DIOVAN-HCT) 80-12.5 mg per tablet 5 Sig: TAKE 1 TAB BY MOUTH DAILY. Class: Normal  
 Pharmacy: [facility]/pharmacy #[id number]- [address], [address] [address] Ph #: [phone] We Performed the Following CBC WITH AUTOMATED DIFF [79758 CPT(R)] LIPID PANEL [78609 CPT(R)] METABOLIC PANEL, COMPREHENSIVE [95654 CPT(R)] T4, FREE [id number] CPT(R)] TSH 3RD GENERATION [72236 CPT(R)] Introducing [facility]! Dear Melony Bermeo: Thank you for requesting a METEOR Network account. Our records indicate that you already have an active METEOR Network account. You can access your account anytime at [email or web address] Did you know that you can access your hospital and ER discharge instructions at any time in METEOR Network? You can also review all of your test results from your hospital stay or ER visit. Additional Information If you have questions, please visit the Frequently Asked Questions section of the METEOR Network website at [email or web address] Remember, METEOR Network is NOT to be used for urgent needs. For medical emergencies, dial 911. Now available from your iPhone and Android! Please provide this summary of care documentation to your next provider. Your primary care clinician is listed as Suzi Dempsey. If you have any questions after today's visit, please call [phone].

## 2018-02-28 LAB
ALBUMIN SERPL-MCNC: 4.4 G/DL (ref 3.5–5.5)
ALBUMIN/GLOB SERPL: 2.2 {RATIO} (ref 1.2–2.2)
ALP SERPL-CCNC: 37 IU/L (ref 39–117)
ALT SERPL-CCNC: 20 IU/L (ref 0–32)
AST SERPL-CCNC: 18 IU/L (ref 0–40)
BASOPHILS # BLD AUTO: 0 X10E3/UL (ref 0–0.2)
BASOPHILS NFR BLD AUTO: 0 %
BILIRUB SERPL-MCNC: 0.3 MG/DL (ref 0–1.2)
BUN SERPL-MCNC: 14 MG/DL (ref 6–24)
BUN/CREAT SERPL: 20 (ref 9–23)
CALCIUM SERPL-MCNC: 9.5 MG/DL (ref 8.7–10.2)
CHLORIDE SERPL-SCNC: 98 MMOL/L (ref 96–106)
CHOLEST SERPL-MCNC: 218 MG/DL (ref 100–199)
CO2 SERPL-SCNC: 25 MMOL/L (ref 18–29)
CREAT SERPL-MCNC: 0.7 MG/DL (ref 0.57–1)
EOSINOPHIL # BLD AUTO: 0.1 X10E3/UL (ref 0–0.4)
EOSINOPHIL NFR BLD AUTO: 2 %
ERYTHROCYTE [DISTWIDTH] IN BLOOD BY AUTOMATED COUNT: 13.8 % (ref 12.3–15.4)
GFR SERPLBLD CREATININE-BSD FMLA CKD-EPI: 110 ML/MIN/1.73
GFR SERPLBLD CREATININE-BSD FMLA CKD-EPI: 95 ML/MIN/1.73
GLOBULIN SER CALC-MCNC: 2 G/DL (ref 1.5–4.5)
GLUCOSE SERPL-MCNC: 100 MG/DL (ref 65–99)
HCT VFR BLD AUTO: 37.3 % (ref 34–46.6)
HDLC SERPL-MCNC: 81 MG/DL
HGB BLD-MCNC: 12.4 G/DL (ref 11.1–15.9)
IMM GRANULOCYTES # BLD: 0 X10E3/UL (ref 0–0.1)
IMM GRANULOCYTES NFR BLD: 0 %
INTERPRETATION, 910389: NORMAL
LDLC SERPL CALC-MCNC: 115 MG/DL (ref 0–99)
LYMPHOCYTES # BLD AUTO: 1.9 X10E3/UL (ref 0.7–3.1)
LYMPHOCYTES NFR BLD AUTO: 36 %
MCH RBC QN AUTO: 30.7 PG (ref 26.6–33)
MCHC RBC AUTO-ENTMCNC: 33.2 G/DL (ref 31.5–35.7)
MCV RBC AUTO: 92 FL (ref 79–97)
MONOCYTES # BLD AUTO: 0.4 X10E3/UL (ref 0.1–0.9)
MONOCYTES NFR BLD AUTO: 7 %
NEUTROPHILS # BLD AUTO: 3 X10E3/UL (ref 1.4–7)
NEUTROPHILS NFR BLD AUTO: 55 %
PLATELET # BLD AUTO: 312 X10E3/UL (ref 150–379)
POTASSIUM SERPL-SCNC: 4.2 MMOL/L (ref 3.5–5.2)
PROT SERPL-MCNC: 6.4 G/DL (ref 6–8.5)
RBC # BLD AUTO: 4.04 X10E6/UL (ref 3.77–5.28)
SODIUM SERPL-SCNC: 138 MMOL/L (ref 134–144)
T4 FREE SERPL-MCNC: 0.91 NG/DL (ref 0.82–1.77)
TRIGL SERPL-MCNC: 112 MG/DL (ref 0–149)
TSH SERPL DL<=0.005 MIU/L-ACNC: 0.76 UIU/ML (ref 0.45–4.5)
VLDLC SERPL CALC-MCNC: 22 MG/DL (ref 5–40)
WBC # BLD AUTO: 5.4 X10E3/UL (ref 3.4–10.8)

## 2018-02-28 NOTE — PROGRESS NOTES
HISTORY OF PRESENT ILLNESS  Priyank Cheema is a 59 y.o. female. HPI  Carmen Max is seen for med check. She was treated for UTI. Her urine culture was unremarkable and we have discussed this. She is asking about Diflucan, but currently not having any symptoms of vaginitis. She started using preventive Bactrim postcoital and feels this is helping. She is frustrated because she has not been losing weight. We have discussed a food diary, which she has kept in the past.   is reviewed. She has been using Klonopin 0.5 mg 30 tablets filled in December and filled again at the end of January. She still has 10 left from the January prescription. We have discussed long term risks with this. She takes it for restless leg, but also to relax. I have encouraged her to use it primarily for the restless leg. She is still on Lexapro, which seems to be helping with mood overall. She denies depressive symptoms. Review of Systems   Constitutional: Negative for chills, fever and weight loss. Respiratory: Negative for cough, shortness of breath and wheezing. Cardiovascular: Negative for chest pain, palpitations, orthopnea, leg swelling and PND. Gastrointestinal: Negative for abdominal pain, heartburn and nausea. Genitourinary: Negative for dysuria. Musculoskeletal: Negative for falls and myalgias. Neurological: Negative for dizziness and headaches. Psychiatric/Behavioral: Negative for depression. The patient is not nervous/anxious and does not have insomnia. Physical Exam   Constitutional: She is oriented to person, place, and time. She appears well-developed and well-nourished. HENT:   Head: Normocephalic and atraumatic. Neck: Normal range of motion. Neck supple. Carotid bruit is not present. No thyromegaly present. Cardiovascular: Normal rate, regular rhythm, S1 normal, S2 normal, normal heart sounds and intact distal pulses. No murmur heard.   Pulmonary/Chest: Effort normal and breath sounds normal. No respiratory distress. She has no wheezes. She has no rales. Musculoskeletal: She exhibits no edema. Neurological: She is alert and oriented to person, place, and time. Psychiatric: She has a normal mood and affect. Her behavior is normal.   Nursing note and vitals reviewed. ASSESSMENT and PLAN  Diagnoses and all orders for this visit:    1. Anxiety and depression-discussed risks with benzos and encouraged use med prn rls but try to avoid nightly use  -     escitalopram oxalate (LEXAPRO) 20 mg tablet; TAKE 1 TABLET BY MOUTH DAILY  -     clonazePAM (KLONOPIN) 0.5 mg tablet; TAKE 1 TABLET BY MOUTH AT BEDTIME AS NEEDED    2. HTN, goal below 130/80  -     valsartan-hydroCHLOROthiazide (DIOVAN-HCT) 80-12.5 mg per tablet; TAKE 1 TAB BY MOUTH DAILY. 3. Weight gain  -     METABOLIC PANEL, COMPREHENSIVE  -     LIPID PANEL  -     TSH 3RD GENERATION  -     T4, FREE    4.  Restless leg syndrome  -     CBC WITH AUTOMATED DIFF    Keep food diary and rto with log

## 2018-03-03 DIAGNOSIS — F41.9 ANXIETY AND DEPRESSION: ICD-10-CM

## 2018-03-03 DIAGNOSIS — F32.A ANXIETY AND DEPRESSION: ICD-10-CM

## 2018-03-03 RX ORDER — ESCITALOPRAM OXALATE 20 MG/1
TABLET ORAL
Qty: 30 TAB | Refills: 5 | Status: SHIPPED | OUTPATIENT
Start: 2018-03-03 | End: 2018-06-07 | Stop reason: DRUGHIGH

## 2018-03-19 ENCOUNTER — TELEPHONE (OUTPATIENT)
Dept: INTERNAL MEDICINE CLINIC | Age: 58
End: 2018-03-19

## 2018-03-19 ENCOUNTER — OFFICE VISIT (OUTPATIENT)
Dept: INTERNAL MEDICINE CLINIC | Age: 58
End: 2018-03-19

## 2018-03-19 VITALS
DIASTOLIC BLOOD PRESSURE: 72 MMHG | HEIGHT: 66 IN | OXYGEN SATURATION: 98 % | HEART RATE: 57 BPM | BODY MASS INDEX: 27.8 KG/M2 | SYSTOLIC BLOOD PRESSURE: 144 MMHG | TEMPERATURE: 97.7 F | WEIGHT: 173 LBS | RESPIRATION RATE: 16 BRPM

## 2018-03-19 DIAGNOSIS — N89.8 VAGINAL ITCHING: ICD-10-CM

## 2018-03-19 DIAGNOSIS — R30.0 BURNING WITH URINATION: Primary | ICD-10-CM

## 2018-03-19 LAB
BILIRUB UR QL STRIP: NEGATIVE
GLUCOSE UR-MCNC: NEGATIVE MG/DL
KETONES P FAST UR STRIP-MCNC: NEGATIVE MG/DL
PH UR STRIP: 7 [PH] (ref 4.6–8)
PROT UR QL STRIP: NEGATIVE
SP GR UR STRIP: 1.01 (ref 1–1.03)
UA UROBILINOGEN AMB POC: NORMAL (ref 0.2–1)
URINALYSIS CLARITY POC: CLEAR
URINALYSIS COLOR POC: YELLOW
URINE BLOOD POC: NEGATIVE
URINE LEUKOCYTES POC: NEGATIVE
URINE NITRITES POC: NEGATIVE

## 2018-03-19 NOTE — TELEPHONE ENCOUNTER
Patient called stating she believes she has a UTI and was hoping to be seen today. Patient is scheduled to be seen with Saul berrios afternoon but patient wants something sooner if possible.          She can be reached at [phone]

## 2018-03-19 NOTE — PROGRESS NOTES
HISTORY OF PRESENT ILLNESS  Heather Mckeon is a 59 y.o. female. HPI  Seen with recurrent episodes of vaginal discomfort. She was having somewhat of what she calls bladder pain, but also had pain with intercourse this weekend. She's had two urine cultures for similar symptoms, which have been no growth, most recently in February. She's not having hematuria or vaginal discharge. She is on a hormone regimen with Dr. Tatiana Desir, but does not vaginal discomfort and dryness. She is using postcoital antibiotics. Review of Systems   Constitutional: Negative for chills, fever and weight loss. Respiratory: Negative for cough, shortness of breath and wheezing. Cardiovascular: Negative for chest pain, palpitations, orthopnea, leg swelling and PND. Gastrointestinal: Negative for heartburn and nausea. Genitourinary: Positive for frequency. Negative for flank pain, hematuria and urgency. Musculoskeletal: Negative for myalgias. Neurological: Negative for dizziness and headaches. Physical Exam   Constitutional: She is oriented to person, place, and time. She appears well-developed and well-nourished. HENT:   Head: Normocephalic and atraumatic. Neck: Normal range of motion. Neck supple. Carotid bruit is not present. Cardiovascular: Normal rate, regular rhythm, S1 normal, S2 normal, normal heart sounds and intact distal pulses. No murmur heard. Pulmonary/Chest: Effort normal and breath sounds normal. No respiratory distress. Abdominal: Soft. Bowel sounds are normal. She exhibits no mass. There is no tenderness. No cvat   Genitourinary: No vaginal discharge found. Genitourinary Comments: Dry tissue vaginally no lesions   Musculoskeletal: She exhibits no edema. Neurological: She is alert and oriented to person, place, and time. Psychiatric: She has a normal mood and affect. Her behavior is normal.   Nursing note and vitals reviewed.       ASSESSMENT and PLAN  Diagnoses and all orders for this visit:    1. Burning with urination  -     AMB POC URINALYSIS DIP STICK AUTO W/O MICRO  -     CULTURE, URINE  -     NUSWAB VAGINITIS    2.  Vaginal itching  -     AMB POC URINALYSIS DIP STICK AUTO W/O MICRO  -     CULTURE, URINE  -     NUSWAB VAGINITIS    Suspect sxs are all due to vaginal atrophic changes and if cx neg see dr Juno Walden her gyn and consider a change in hormone regimen

## 2018-03-19 NOTE — TELEPHONE ENCOUNTER
PCP is booked today, next available work-in appt is tomorrow. If a cancellation is available will call patient to see if she can come in.

## 2018-03-20 LAB — BACTERIA UR CULT: NO GROWTH

## 2018-03-23 ENCOUNTER — TELEPHONE (OUTPATIENT)
Dept: INTERNAL MEDICINE CLINIC | Age: 58
End: 2018-03-23

## 2018-03-23 LAB
A VAGINAE DNA VAG QL NAA+PROBE: ABNORMAL SCORE
BVAB2 DNA VAG QL NAA+PROBE: ABNORMAL SCORE
C ALBICANS DNA VAG QL NAA+PROBE: NEGATIVE
C GLABRATA DNA VAG QL NAA+PROBE: NEGATIVE
MEGA1 DNA VAG QL NAA+PROBE: ABNORMAL SCORE
T VAGINALIS RRNA SPEC QL NAA+PROBE: NEGATIVE

## 2018-03-23 RX ORDER — METRONIDAZOLE 7.5 MG/G
1 GEL VAGINAL
Qty: 187.5 MG | Refills: 0 | Status: SHIPPED | OUTPATIENT
Start: 2018-03-23 | End: 2018-08-24 | Stop reason: SDUPTHER

## 2018-03-23 NOTE — TELEPHONE ENCOUNTER
Patient notified of nuswab results. Advised metrogel has been sent to her pharmacy on file to help cover the bacterial overgrowth. Patient voiced understanding.

## 2018-03-23 NOTE — TELEPHONE ENCOUNTER
Can you let her know I did fine mild sign of bacterial overgrowth on her swab so I did send int the nightly metrogel to use to cover for this

## 2018-04-25 ENCOUNTER — OFFICE VISIT (OUTPATIENT)
Dept: INTERNAL MEDICINE CLINIC | Age: 58
End: 2018-04-25

## 2018-04-25 VITALS
OXYGEN SATURATION: 97 % | BODY MASS INDEX: 27.19 KG/M2 | HEART RATE: 76 BPM | SYSTOLIC BLOOD PRESSURE: 148 MMHG | DIASTOLIC BLOOD PRESSURE: 75 MMHG | WEIGHT: 169.2 LBS | TEMPERATURE: 98.5 F | HEIGHT: 66 IN | RESPIRATION RATE: 14 BRPM

## 2018-04-25 DIAGNOSIS — Z11.1 SCREENING FOR TUBERCULOSIS: ICD-10-CM

## 2018-04-25 DIAGNOSIS — Z87.440 HISTORY OF RECURRENT UTI (URINARY TRACT INFECTION): ICD-10-CM

## 2018-04-25 DIAGNOSIS — N89.8 VAGINAL ITCHING: ICD-10-CM

## 2018-04-25 DIAGNOSIS — R30.0 DYSURIA: ICD-10-CM

## 2018-04-25 DIAGNOSIS — R10.30 LOWER ABDOMINAL PAIN: Primary | ICD-10-CM

## 2018-04-25 LAB
BILIRUB UR QL STRIP: NEGATIVE
GLUCOSE UR-MCNC: NEGATIVE MG/DL
KETONES P FAST UR STRIP-MCNC: NEGATIVE MG/DL
PH UR STRIP: 7.5 [PH] (ref 4.6–8)
PROT UR QL STRIP: NEGATIVE
SP GR UR STRIP: 1.01 (ref 1–1.03)
UA UROBILINOGEN AMB POC: NORMAL (ref 0.2–1)
URINALYSIS CLARITY POC: CLEAR
URINALYSIS COLOR POC: YELLOW
URINE BLOOD POC: NEGATIVE
URINE LEUKOCYTES POC: NEGATIVE
URINE NITRITES POC: NEGATIVE

## 2018-04-25 RX ORDER — FLUCONAZOLE 150 MG/1
150 TABLET ORAL DAILY
Qty: 2 TAB | Refills: 0 | Status: SHIPPED | OUTPATIENT
Start: 2018-04-25 | End: 2018-09-04 | Stop reason: SDUPTHER

## 2018-04-25 NOTE — MR AVS SNAPSHOT
[address] [facility] [address] 
 
 
 [address] [facility] [place] [address] [facility] 
[phone] Patient: Marina Howe MRN: [id number] MDA:[id number] Visit Information Date & Time Provider Department Dept. Phone Encounter #  
 4/25/2018 10:00 AM Kia Qureshi NP [facility] [address]  [id number] Upcoming Health Maintenance Date Due Hepatitis C Screening 1960 Pneumococcal 19-64 Medium Risk (1 of 1 - PPSV23) 12/11/1977 PAP AKA CERVICAL CYTOLOGY 7/22/2017 Influenza Age 5 to Adult 8/1/2017 BREAST CANCER SCRN MAMMOGRAM 9/27/2018 COLONOSCOPY 5/19/2019 DTaP/Tdap/Td series (2 - Td) 2/18/2026 Allergies as of 4/25/2018  Review Complete On: 4/25/2018 By: Chi Sanchez LPN Severity Noted Reaction Type Reactions Pollen Extracts  08/07/2014    Sneezing Current Immunizations  Reviewed on 10/4/2016 Name Date Tdap 2/18/2016 Not reviewed this visit You Were Diagnosed With   
  
 Codes Comments Lower abdominal pain    -  Primary ICD-10-CM: R10.30 ICD-9-CM: 789.09 Dysuria     ICD-10-CM: R30.0 ICD-9-CM: 788.1 History of recurrent UTI (urinary tract infection)     ICD-10-CM: Z87.440 ICD-9-CM: V13.02 Screening for tuberculosis     ICD-10-CM: Z11.1 ICD-9-CM: V74.1 Vaginal itching     ICD-10-CM: L29.8 ICD-9-CM: 698.1 Vitals BP Pulse Temp Resp Height(growth percentile) Weight(growth percentile) 148/75 (BP 1 Location: Left arm, BP Patient Position: Sitting) 76 98.5 °F (36.9 °C) (Oral) 14 5' 6\" (1.676 m) 169 lb 3.2 oz (76.7 kg) SpO2 BMI OB Status Smoking Status 97% 27.31 kg/m2 Menopause Never Smoker BMI and BSA Data Body Mass Index Body Surface Area  
 27.31 kg/m 2 1.89 m 2 Preferred Pharmacy Pharmacy Name Phone [id number] [address], [address] [place] [address]  [phone] Your Updated Medication List  
  
   
 This list is accurate as of 4/25/18 10:47 AM.  Always use your most recent med list.  
  
  
  
  
 clonazePAM 0.5 mg tablet Commonly known as:  KlonoPIN  
TAKE 1 TABLET BY MOUTH AT BEDTIME AS NEEDED  
  
 diclofenac EC 75 mg EC tablet Commonly known as:  VOLTAREN Take 75 mg by mouth. * escitalopram oxalate 20 mg tablet Commonly known as:  Colen Brunner TAKE 1 TABLET BY MOUTH DAILY  
  
 * escitalopram oxalate 20 mg tablet Commonly known as:  Colen Brunner TAKE 1 TABLET BY MOUTH DAILY  
  
 fexofenadine 180 mg tablet Commonly known as:  Paul Lesia Take 1 Tab by mouth daily. Indications: ALLERGIC CONJUNCTIVITIS  
  
 fluconazole 150 mg tablet Commonly known as:  DIFLUCAN Take 1 Tab by mouth daily for 1 day. May repeat in 4 days with 2nd tablet if needed  
  
 montelukast 10 mg tablet Commonly known as:  SINGULAIR  
TAKE 1 TABLET BY MOUTH AT BEDTIME  
  
 OTHER  
biest (estriol/estradial 80/20%) progesterone 100 mg capsule Commonly known as:  PROMETRIUM Take 100 mg by mouth daily. Progesterone/pregnenolone E4M 100/50mg TESTOSTERONE CREAM  
Apply  to affected area. Testosterone Cream 1.25mg  
  
 thyroid (Pork) 32.5 mg tablet Commonly known as:  NATURE-THROID/WESTHROID Take  by mouth daily. trimethoprim-sulfamethoxazole  mg per tablet Commonly known as:  Merrily Kilgore Take 1 Tab by mouth daily as needed. 1 every day prn  
  
 valACYclovir 1 gram tablet Commonly known as:  VALTREX Take 1 Tab by mouth as needed. Used for cold sores. valsartan-hydroCHLOROthiazide 80-12.5 mg per tablet Commonly known as:  DIOVAN-HCT  
TAKE 1 TAB BY MOUTH DAILY. VENTOLIN HFA 90 mcg/actuation inhaler Generic drug:  albuterol TAKE 1 TO 2 PUFFS EVERY 4 TO 6 HOURS AS NEEDED * Notice: This list has 2 medication(s) that are the same as other medications prescribed for you.  Read the directions carefully, and ask your doctor or other care provider to review them with you. Prescriptions Sent to Pharmacy Refills  
 fluconazole (DIFLUCAN) 150 mg tablet 0 Sig: Take 1 Tab by mouth daily for 1 day. May repeat in 4 days with 2nd tablet if needed Class: Normal  
 Pharmacy: [id number] [address], [address] [place] [address] Dr Calderón #: [phone] Route: Oral  
  
We Performed the Following AMB POC URINALYSIS DIP STICK AUTO W/O MICRO [42287 CPT(R)] CULTURE, URINE [id number] CPT(R)] [address] [id number] Custom] QUANTIFERON TB GOLD [[id number] Custom] REFERRAL TO FEMALE PELVIC MEDICINE AND RECONSTRUCTIVE SURGERY [[id number] Custom] Referral Information Referral ID Referred By Referred To  
  
 [id number] Heydi Virk MD   
   [address] [place] [address], [address] [place] [address] Pkwy Phone: [phone] Fax: [phone] Visits Status Start Date End Date 1 New Request 4/25/18 4/25/19 If your referral has a status of pending review or denied, additional information will be sent to support the outcome of this decision. Patient Instructions Painful Urination (Dysuria): Care Instructions Your Care Instructions Burning pain with urination (dysuria) is a common symptom of a urinary tract infection or other urinary problems. The bladder may become inflamed. This can cause pain when the bladder fills and empties. You may also feel pain if the tube that carries urine from the bladder to the outside of the body (urethra) gets irritated or infected. Sexually transmitted infections (STIs) also may cause pain when you urinate. Sometimes the pain can be caused by things other than an infection. The urethra can be irritated by soaps, perfumes, or foreign objects in the urethra. Kidney stones can cause pain when they pass through the urethra. The cause may be hard to find. You may need tests.  Treatment for painful urination depends on the cause. Follow-up care is a key part of your treatment and safety. Be sure to make and go to all appointments, and call your doctor if you are having problems. It's also a good idea to know your test results and keep a list of the medicines you take. How can you care for yourself at home? · Drink extra water for the next day or two. This will help make the urine less concentrated. (If you have kidney, heart, or liver disease and have to limit fluids, talk with your doctor before you increase the amount of fluids you drink.) · Avoid drinks that are carbonated or have caffeine. They can irritate the bladder. · Urinate often. Try to empty your bladder each time. For women: · Urinate right after you have sex. · After going to the bathroom, wipe from front to back. · Avoid douches, bubble baths, and feminine hygiene sprays. And avoid other feminine hygiene products that have deodorants. When should you call for help? Call your doctor now or seek immediate medical care if: 
? · You have new symptoms, such as fever, nausea, or vomiting. ? · You have new or worse symptoms of a urinary problem. For example: ¨ You have blood or pus in your urine. ¨ You have chills or body aches. ¨ It hurts worse to urinate. ¨ You have groin or belly pain. ¨ You have pain in your back just below your rib cage (the flank area). ? Watch closely for changes in your health, and be sure to contact your doctor if you have any problems. Where can you learn more? Go to http://rosaline-jared.info/. Enter V616 in the search box to learn more about \"Painful Urination (Dysuria): Care Instructions. \" Current as of: May 12, 2017 Content Version: 11.4 © 8084-7855 Vaughn Burton. Care instructions adapted under license by [employer] (which disclaims liability or warranty for this information).  If you have questions about a medical condition or this instruction, always ask your healthcare professional. [place] [address] any warranty or liability for your use of this information. Interstitial Cystitis: Care Instructions Your Care Instructions Interstitial cystitis is a long-term irritation of the bladder. It can cause mild to severe pain that comes and goes. You also may feel a sudden urge to urinate or need to urinate often. Sometimes the walls of the bladder become scarred or get stiff. Doctors do not know what causes interstitial cystitis. But they do know that it is not caused by an infection. The problem is much more common in women than in men. Your doctor may do tests to make sure that you do not have an infection, kidney stones, or bladder cancer. Because the cause of interstitial cystitis is not known, your doctor may try several treatments. It may take several weeks or months to find a treatment that works. If diet and lifestyle changes do not help, you may need medicine. Your doctor may also put liquid or medicine into your bladder for a short time to treat the pain. Follow-up care is a key part of your treatment and safety. Be sure to make and go to all appointments, and call your doctor if you are having problems. It's also a good idea to know your test results and keep a list of the medicines you take. How can you care for yourself at home? · Take your medicines exactly as prescribed. Call your doctor if you think you are having a problem with your medicine. · If your doctor prescribed antibiotics, take them as directed. Do not stop taking them just because you feel better. You need to take the full course of antibiotics. · Avoid eating spicy foods or high-acid foods, such as tomatoes and oranges, if these foods seem to make your pain worse. Also, limit caffeine and alcohol. · If a certain food seems to cause pain in your bladder, stop eating it to see if the pain goes away. · Do not smoke. Smoking can irritate the bladder and cause bladder cancer. If you need help quitting, talk to your doctor about stop-smoking programs and medicines. These can increase your chances of quitting for good. · Try bladder training. Set certain times to go to the bathroom and slowly increase the time between visits. This may help lengthen the time your bladder can hold urine. · You might try a treatment called TENS. It sends a very mild electric current through wires placed near the pubic area. This is done for at least several minutes 2 times each day. · Consider a support group. Sharing your experiences with other people who have the same problem may help you learn more and cope better. · Wash your pubic area with a mild soap. Avoid deodorant soaps or soaps with heavy perfumes. · Wear loose-fitting clothing that does not put pressure on your bladder. When should you call for help? Call your doctor now or seek immediate medical care if: 
? · You have symptoms of a urinary infection. For example: ¨ You have blood or pus in your urine. ¨ You have pain in your back just below your rib cage. This is called flank pain. ¨ You have a fever, chills, or body aches. ¨ It hurts to urinate. ¨ You have groin or belly pain. ? Watch closely for changes in your health, and be sure to contact your doctor if: 
? · You do not get better as expected. Where can you learn more? Go to http://rosaline-jared.info/. Enter P303 in the search box to learn more about \"Interstitial Cystitis: Care Instructions. \" Current as of: May 12, 2017 Content Version: 11.4 © 1263-3901 [employer]. Care instructions adapted under license by [employer] (which disclaims liability or warranty for this information).  If you have questions about a medical condition or this instruction, always ask your healthcare professional. [employer] disclaims any warranty or liability for your use of this information. Vaginitis: Care Instructions Your Care Instructions Vaginitis is soreness or infection of the vagina. This common problem can cause itching and burning. And it can cause a change in vaginal discharge. Sometimes it can cause pain during sex. Vaginitis may be caused by bacteria, yeast, or other germs. Some infections that cause it are caught from a sexual partner. Bath products, spermicides, and douches can irritate the vagina too. Some women have this problem during and after menopause. A drop in estrogen levels during this time can cause dryness, soreness, and pain during sex. Your doctor can give you medicine to treat an infection. And home care may help you feel better. For certain types of infections, your sex partner must be treated too. Follow-up care is a key part of your treatment and safety. Be sure to make and go to all appointments, and call your doctor if you are having problems. It's also a good idea to know your test results and keep a list of the medicines you take. How can you care for yourself at home? · If your doctor prescribed antibiotics, take them as directed. Do not stop taking them just because you feel better. You need to take the full course of antibiotics. · Take your medicines exactly as prescribed. Call your doctor if you think you are having a problem with your medicine. · Do not eat or drink anything that has alcohol if you are taking metronidazole (Flagyl). · If you have a yeast infection, use over-the-counter products as your doctor tells you to. Or take medicine your doctor prescribes exactly as directed. · Wash your vaginal area daily with water. You also can use a mild, unscented soap if you want. · Do not use scented bath products. And do not use vaginal sprays or douches. · Put a washcloth soaked in cool water on the area to relieve itching. Or you can take cool baths. · If you have dryness because of menopause, use estrogen cream or pills that your doctor prescribes. · Ask your doctor about when it is okay to have sex. · Use a personal lubricant before sex if you have dryness. Examples are Astroglide, K-Y Jelly, and Wet Lubricant Gel. · Ask your doctor if your sex partner also needs treatment. When should you call for help? Call your doctor now or seek immediate medical care if: 
? · You have a fever and pelvic pain. ? Watch closely for changes in your health, and be sure to contact your doctor if: 
? · You have bleeding other than your period. ? · You do not get better as expected. Where can you learn more? Go to http://rosaline-jared.info/. Enter R064 in the search box to learn more about \"Vaginitis: Care Instructions. \" Current as of: October 13, 2016 Content Version: 11.4 © 4234-0076 3[employer]. Care instructions adapted under license by [employer] (which disclaims liability or warranty for this information). If you have questions about a medical condition or this instruction, always ask your healthcare professional. [address] any warranty or liability for your use of this information. Introducing [facility]! Dear Hai Calderon: Thank you for requesting a TheTake account. Our records indicate that you already have an active TheTake account. You can access your account anytime at [email or web address] Did you know that you can access your hospital and ER discharge instructions at any time in TheTake? You can also review all of your test results from your hospital stay or ER visit. Additional Information If you have questions, please visit the Frequently Asked Questions section of the TheTake website at [email or web address] Remember, TheTake is NOT to be used for urgent needs. For medical emergencies, dial 911. Now available from your iPhone and Android! Please provide this summary of care documentation to your next provider. Your primary care clinician is listed as Suzi 68. If you have any questions after today's visit, please call [phone].

## 2018-04-25 NOTE — PATIENT INSTRUCTIONS
Painful Urination (Dysuria): Care Instructions  Your Care Instructions  Burning pain with urination (dysuria) is a common symptom of a urinary tract infection or other urinary problems. The bladder may become inflamed. This can cause pain when the bladder fills and empties. You may also feel pain if the tube that carries urine from the bladder to the outside of the body (urethra) gets irritated or infected. Sexually transmitted infections (STIs) also may cause pain when you urinate. Sometimes the pain can be caused by things other than an infection. The urethra can be irritated by soaps, perfumes, or foreign objects in the urethra. Kidney stones can cause pain when they pass through the urethra. The cause may be hard to find. You may need tests. Treatment for painful urination depends on the cause. Follow-up care is a key part of your treatment and safety. Be sure to make and go to all appointments, and call your doctor if you are having problems. It's also a good idea to know your test results and keep a list of the medicines you take. How can you care for yourself at home? · Drink extra water for the next day or two. This will help make the urine less concentrated. (If you have kidney, heart, or liver disease and have to limit fluids, talk with your doctor before you increase the amount of fluids you drink.)  · Avoid drinks that are carbonated or have caffeine. They can irritate the bladder. · Urinate often. Try to empty your bladder each time. For women:  · Urinate right after you have sex. · After going to the bathroom, wipe from front to back. · Avoid douches, bubble baths, and feminine hygiene sprays. And avoid other feminine hygiene products that have deodorants. When should you call for help? Call your doctor now or seek immediate medical care if:  ? · You have new symptoms, such as fever, nausea, or vomiting. ? · You have new or worse symptoms of a urinary problem.  For example:  Emanuel Muniz have blood or pus in your urine. ¨ You have chills or body aches. ¨ It hurts worse to urinate. ¨ You have groin or belly pain. ¨ You have pain in your back just below your rib cage (the flank area). ? Watch closely for changes in your health, and be sure to contact your doctor if you have any problems. Where can you learn more? Go to http://rosaline-jared.info/. Enter N734 in the search box to learn more about \"Painful Urination (Dysuria): Care Instructions. \"  Current as of: May 12, 2017  Content Version: 11.4  © 7387-8001 [employer]. Care instructions adapted under license by [employer] (which disclaims liability or warranty for this information). If you have questions about a medical condition or this instruction, always ask your healthcare professional. [place] [address] any warranty or liability for your use of this information. Interstitial Cystitis: Care Instructions  Your Care Instructions    Interstitial cystitis is a long-term irritation of the bladder. It can cause mild to severe pain that comes and goes. You also may feel a sudden urge to urinate or need to urinate often. Sometimes the walls of the bladder become scarred or get stiff. Doctors do not know what causes interstitial cystitis. But they do know that it is not caused by an infection. The problem is much more common in women than in men. Your doctor may do tests to make sure that you do not have an infection, kidney stones, or bladder cancer. Because the cause of interstitial cystitis is not known, your doctor may try several treatments. It may take several weeks or months to find a treatment that works. If diet and lifestyle changes do not help, you may need medicine. Your doctor may also put liquid or medicine into your bladder for a short time to treat the pain. Follow-up care is a key part of your treatment and safety.  Be sure to make and go to all appointments, and call your doctor if you are having problems. It's also a good idea to know your test results and keep a list of the medicines you take. How can you care for yourself at home? · Take your medicines exactly as prescribed. Call your doctor if you think you are having a problem with your medicine. · If your doctor prescribed antibiotics, take them as directed. Do not stop taking them just because you feel better. You need to take the full course of antibiotics. · Avoid eating spicy foods or high-acid foods, such as tomatoes and oranges, if these foods seem to make your pain worse. Also, limit caffeine and alcohol. · If a certain food seems to cause pain in your bladder, stop eating it to see if the pain goes away. · Do not smoke. Smoking can irritate the bladder and cause bladder cancer. If you need help quitting, talk to your doctor about stop-smoking programs and medicines. These can increase your chances of quitting for good. · Try bladder training. Set certain times to go to the bathroom and slowly increase the time between visits. This may help lengthen the time your bladder can hold urine. · You might try a treatment called TENS. It sends a very mild electric current through wires placed near the pubic area. This is done for at least several minutes 2 times each day. · Consider a support group. Sharing your experiences with other people who have the same problem may help you learn more and cope better. · Wash your pubic area with a mild soap. Avoid deodorant soaps or soaps with heavy perfumes. · Wear loose-fitting clothing that does not put pressure on your bladder. When should you call for help? Call your doctor now or seek immediate medical care if:  ? · You have symptoms of a urinary infection. For example:  ¨ You have blood or pus in your urine. ¨ You have pain in your back just below your rib cage. This is called flank pain. ¨ You have a fever, chills, or body aches.   ¨ It hurts to urinate. ¨ You have groin or belly pain. ? Watch closely for changes in your health, and be sure to contact your doctor if:  ? · You do not get better as expected. Where can you learn more? Go to http://rosaline-jared.info/. Enter Z807 in the search box to learn more about \"Interstitial Cystitis: Care Instructions. \"  Current as of: May 12, 2017  Content Version: 11.4  © 6181-3019 [employer]. Care instructions adapted under license by [employer] (which disclaims liability or warranty for this information). If you have questions about a medical condition or this instruction, always ask your healthcare professional. [address] any warranty or liability for your use of this information. Vaginitis: Care Instructions  Your Care Instructions    Vaginitis is soreness or infection of the vagina. This common problem can cause itching and burning. And it can cause a change in vaginal discharge. Sometimes it can cause pain during sex. Vaginitis may be caused by bacteria, yeast, or other germs. Some infections that cause it are caught from a sexual partner. Bath products, spermicides, and douches can irritate the vagina too. Some women have this problem during and after menopause. A drop in estrogen levels during this time can cause dryness, soreness, and pain during sex. Your doctor can give you medicine to treat an infection. And home care may help you feel better. For certain types of infections, your sex partner must be treated too. Follow-up care is a key part of your treatment and safety. Be sure to make and go to all appointments, and call your doctor if you are having problems. It's also a good idea to know your test results and keep a list of the medicines you take. How can you care for yourself at home? · If your doctor prescribed antibiotics, take them as directed. Do not stop taking them just because you feel better.  You need to take the full course of antibiotics. · Take your medicines exactly as prescribed. Call your doctor if you think you are having a problem with your medicine. · Do not eat or drink anything that has alcohol if you are taking metronidazole (Flagyl). · If you have a yeast infection, use over-the-counter products as your doctor tells you to. Or take medicine your doctor prescribes exactly as directed. · Wash your vaginal area daily with water. You also can use a mild, unscented soap if you want. · Do not use scented bath products. And do not use vaginal sprays or douches. · Put a washcloth soaked in cool water on the area to relieve itching. Or you can take cool baths. · If you have dryness because of menopause, use estrogen cream or pills that your doctor prescribes. · Ask your doctor about when it is okay to have sex. · Use a personal lubricant before sex if you have dryness. Examples are Astroglide, K-Y Jelly, and Wet Lubricant Gel. · Ask your doctor if your sex partner also needs treatment. When should you call for help? Call your doctor now or seek immediate medical care if:  ? · You have a fever and pelvic pain. ? Watch closely for changes in your health, and be sure to contact your doctor if:  ? · You have bleeding other than your period. ? · You do not get better as expected. Where can you learn more? Go to http://rosaline-jared.info/. Enter X906 in the search box to learn more about \"Vaginitis: Care Instructions. \"  Current as of: October 13, 2016  Content Version: 11.4  © 4814-5156 [employer] Care instructions adapted under license by [employer] (which disclaims liability or warranty for this information). If you have questions about a medical condition or this instruction, always ask your healthcare professional. [address] any warranty or liability for your use of this information.

## 2018-04-26 NOTE — PROGRESS NOTES
HISTORY OF PRESENT ILLNESS  Luke Burton is a 59 y.o. female. HPI   Presents with complaints of lower pelvic discomfort, vaginal discomfort/itching but also burning sensation for the past week. She felt like she was developing UTI while traveling to [facility]) last week with urinary frequency and burning. She has prescription for low dose Bactrim that she takes post-coitus and started taking this on a daily basis for the past 5 days. Has been taking AZO OTC for the past 2 days with some relief of symptoms. Has seen Dr Dany Haynes in the past and is on hormone regimen. Has been seen multiple times for vaginal discomfort and urinary symptoms. Denies fever, chills. Has not noted significant vaginal discharge. She is interviewing for a new job and needs to have screening for tuberculosis. [place] and is not sure whether she had BCG vaccine as a child; has not had skin PPD placed in past as far as she can recall. Denies chronic cough, hemoptysis, fever, night sweats. Denies exposure to high risk populations. Reports her mother was hospitalized for Tuberculosis when patient was a child. Review of Systems   Constitutional: Negative for chills, fever and malaise/fatigue. HENT: Negative for congestion. Respiratory: Negative for cough and shortness of breath. Cardiovascular: Negative for chest pain and palpitations. Gastrointestinal: Positive for abdominal pain. Negative for constipation, diarrhea, nausea and vomiting. Genitourinary: Positive for dysuria and frequency. Negative for flank pain and hematuria. Musculoskeletal: Negative for myalgias. Neurological: Negative for dizziness and headaches. /75 (BP 1 Location: Left arm, BP Patient Position: Sitting)  Pulse 76  Temp 98.5 °F (36.9 °C) (Oral)   Resp 14  Ht 5' 6\" (1.676 m)  Wt 169 lb 3.2 oz (76.7 kg)  SpO2 97%  BMI 27.31 kg/m2  Physical Exam   Constitutional: She is oriented to person, place, and time.  She appears well-developed and well-nourished. HENT:   Head: Normocephalic and atraumatic. Neck: Normal range of motion. Neck supple. No thyromegaly present. Cardiovascular: Normal rate and regular rhythm. Pulmonary/Chest: Effort normal and breath sounds normal. She has no wheezes. Abdominal: Soft. Bowel sounds are normal. There is tenderness in the suprapubic area. Genitourinary: Uterus normal. Cervix exhibits no motion tenderness. Right adnexum displays no mass. Left adnexum displays no mass. No erythema or tenderness in the vagina. Vaginal discharge found. Genitourinary Comments: Thin white vaginal discharge   Lymphadenopathy:     She has no cervical adenopathy. Neurological: She is alert and oriented to person, place, and time. Psychiatric: She has a normal mood and affect. Her behavior is normal.   Nursing note and vitals reviewed. ASSESSMENT and PLAN  Diagnoses and all orders for this visit:    1. Lower abdominal pain   -     AMB POC URINALYSIS DIP STICK AUTO W/O MICRO  -     REFERRAL TO FEMALE PELVIC MEDICINE AND RECONSTRUCTIVE SURGERY    2. Dysuria -- her urine dip in office appears unremarkable; has treated with low dose Bactrim and will send out urine culture to ensure any infection has cleared  -     CULTURE, URINE    3. History of recurrent UTI (urinary tract infection) -- concern that she may have Interstitial cystitis; she is very concerned about whether her symptoms are related to vaginal versus urinary issues    4. Vaginal itching  -     NUSWAB VAGINITIS PLUS  -     fluconazole (DIFLUCAN) 150 mg tablet; Take 1 Tab by mouth daily for 1 day. May repeat in 4 days with 2nd tablet if needed    5.  Screening for tuberculosis -- unsure whether she had received BCG vaccine in childhood; will send Quantiferon gold  -     QUANTIFERON TB GOLD      lab results and schedule of future lab studies reviewed with patient  reviewed diet, exercise and weight control  reviewed medications and side effects in detail

## 2018-04-27 LAB — BACTERIA UR CULT: NORMAL

## 2018-04-28 LAB
A VAGINAE DNA VAG QL NAA+PROBE: ABNORMAL SCORE
ANNOTATION COMMENT IMP: NORMAL
BVAB2 DNA VAG QL NAA+PROBE: ABNORMAL SCORE
C ALBICANS DNA VAG QL NAA+PROBE: POSITIVE
C GLABRATA DNA VAG QL NAA+PROBE: NEGATIVE
C TRACH RRNA SPEC QL NAA+PROBE: NEGATIVE
GAMMA INTERFERON BACKGROUND BLD IA-ACNC: 0.04 IU/ML
M TB IFN-G BLD-IMP: NEGATIVE
M TB IFN-G CD4+ BCKGRND COR BLD-ACNC: <0 IU/ML
M TB IFN-G CD4+ T-CELLS BLD-ACNC: 0.03 IU/ML
MEGA1 DNA VAG QL NAA+PROBE: ABNORMAL SCORE
MITOGEN IGNF BLD-ACNC: 5.99 IU/ML
N GONORRHOEA RRNA SPEC QL NAA+PROBE: NEGATIVE
QUANTIFERON INCUBATION: NORMAL
SERVICE CMNT-IMP: NORMAL
T VAGINALIS RRNA SPEC QL NAA+PROBE: NEGATIVE

## 2018-04-30 ENCOUNTER — TELEPHONE (OUTPATIENT)
Dept: INTERNAL MEDICINE CLINIC | Age: 58
End: 2018-04-30

## 2018-04-30 ENCOUNTER — PATIENT MESSAGE (OUTPATIENT)
Dept: INTERNAL MEDICINE CLINIC | Age: 58
End: 2018-04-30

## 2018-04-30 NOTE — TELEPHONE ENCOUNTER
Per Patient , she will be stopping by tmrw bout 1 pm to get her letter bout her TB test results. Patient states she was told by Nicolás Parada to strop by Tuesday when she returns to the office.

## 2018-05-01 NOTE — TELEPHONE ENCOUNTER
From: Phoebe Sears NP  To: Noah King  Sent: 4/30/2018 8:20 PM EDT  Subject: results    The testing for Tuberculosis was negative; I will write up a letter for you to  from the  tomorrow when I return to the office. The vaginal swab was positive for Yeast and this should improve with the course of Diflucan that you received at your visit.   Take care,  ALISSA Kinsey

## 2018-05-17 DIAGNOSIS — N39.0 RECURRENT UTI: ICD-10-CM

## 2018-05-17 RX ORDER — SULFAMETHOXAZOLE AND TRIMETHOPRIM 400; 80 MG/1; MG/1
TABLET ORAL
Qty: 25 TAB | Refills: 1 | OUTPATIENT
Start: 2018-05-17

## 2018-05-17 NOTE — TELEPHONE ENCOUNTER
Pt has now had a series of 3 urine cultures all negative I am worried about ongoing use of antibiotic without indication-if her urinary sxs persist she needs to see urology for cystoscopy and further eval-there is no sign of bacterial infection and there is risk with abx-can you help her with urology appt and I did decline the abx refill

## 2018-05-29 DIAGNOSIS — F41.9 ANXIETY AND DEPRESSION: ICD-10-CM

## 2018-05-29 DIAGNOSIS — F32.A ANXIETY AND DEPRESSION: ICD-10-CM

## 2018-05-30 RX ORDER — CLONAZEPAM 0.5 MG/1
TABLET ORAL
Qty: 30 TAB | Refills: 1 | Status: SHIPPED | OUTPATIENT
Start: 2018-05-30 | End: 2018-10-16 | Stop reason: SDUPTHER

## 2018-06-07 ENCOUNTER — OFFICE VISIT (OUTPATIENT)
Dept: INTERNAL MEDICINE CLINIC | Age: 58
End: 2018-06-07

## 2018-06-07 VITALS
TEMPERATURE: 99.5 F | SYSTOLIC BLOOD PRESSURE: 111 MMHG | BODY MASS INDEX: 26.2 KG/M2 | HEIGHT: 66 IN | RESPIRATION RATE: 18 BRPM | DIASTOLIC BLOOD PRESSURE: 71 MMHG | OXYGEN SATURATION: 99 % | WEIGHT: 163 LBS | HEART RATE: 78 BPM

## 2018-06-07 DIAGNOSIS — G25.81 RESTLESS LEG SYNDROME: ICD-10-CM

## 2018-06-07 DIAGNOSIS — F32.89 OTHER DEPRESSION: ICD-10-CM

## 2018-06-07 DIAGNOSIS — I10 HTN, GOAL BELOW 130/80: ICD-10-CM

## 2018-06-07 DIAGNOSIS — G25.81 RLS (RESTLESS LEGS SYNDROME): Primary | ICD-10-CM

## 2018-06-07 RX ORDER — PHENTERMINE HYDROCHLORIDE 37.5 MG/1
1 TABLET ORAL
Refills: 2 | COMMUNITY
Start: 2018-05-15

## 2018-06-07 RX ORDER — ESCITALOPRAM OXALATE 10 MG/1
10 TABLET ORAL DAILY
Qty: 30 TAB | Refills: 5 | Status: SHIPPED | OUTPATIENT
Start: 2018-06-07 | End: 2019-01-21 | Stop reason: ALTCHOICE

## 2018-06-07 RX ORDER — ROPINIROLE 0.5 MG/1
0.5 TABLET, FILM COATED ORAL
Qty: 30 TAB | Refills: 2 | Status: SHIPPED | OUTPATIENT
Start: 2018-06-07 | End: 2018-08-24 | Stop reason: SDUPTHER

## 2018-06-07 RX ORDER — METFORMIN HYDROCHLORIDE 500 MG/1
2 TABLET, EXTENDED RELEASE ORAL DAILY
Refills: 0 | COMMUNITY
Start: 2018-03-29

## 2018-06-07 RX ORDER — PHENDIMETRAZINE TARTRATE 35 MG/1
1 TABLET ORAL
Refills: 2 | COMMUNITY
Start: 2018-05-15

## 2018-06-07 NOTE — PROGRESS NOTES
HISTORY OF PRESENT ILLNESS  Noah King is a 59 y.o. female. HPI  Seen to discuss her restless leg syndrome. She has been using Klonopin prn, but does not feel it is controlling things. When she does not take the Klonopin she really cannot sleep until 2 AM.  When she does take it it seems to help. She's on Lexapro 20 and we discussed that higher dose SSRI may be contributing. Mood is stable. We're going to drop to 10. She will see Dr. Jess Reyes at [facility] in July. He is a specialist.  He is asking for a ferritin level. Will check this today. She remains on Diovan/HCTZ for blood pressure and has not had muscle cramps. She has tried an OTC magnesium supplement, which did not help. Review of Systems   Constitutional: Negative for chills, fever and weight loss. Respiratory: Negative for cough, shortness of breath and wheezing. Cardiovascular: Negative for chest pain, palpitations, orthopnea, leg swelling and PND. Gastrointestinal: Negative for heartburn and nausea. Musculoskeletal: Negative for myalgias. Neurological: Negative for dizziness and headaches. Psychiatric/Behavioral: Negative for depression. The patient has insomnia. The patient is not nervous/anxious. Physical Exam   Constitutional: She is oriented to person, place, and time. She appears well-developed and well-nourished. HENT:   Head: Normocephalic and atraumatic. Neck: Normal range of motion. Neck supple. Carotid bruit is not present. No thyromegaly present. Cardiovascular: Normal rate, regular rhythm, S1 normal, S2 normal, normal heart sounds and intact distal pulses. No murmur heard. Pulmonary/Chest: Effort normal and breath sounds normal. No respiratory distress. She has no wheezes. She has no rales. Musculoskeletal: She exhibits no edema. Neurological: She is alert and oriented to person, place, and time. Psychiatric: She has a normal mood and affect.  Her behavior is normal.   Nursing note and vitals reviewed. ASSESSMENT and PLAN  Diagnoses and all orders for this visit:    1. RLS (restless legs syndrome)  -     FERRITIN  -     rOPINIRole (REQUIP) 0.5 mg tablet; Take 1 Tab by mouth nightly. 2. HTN, goal below 322/36  -     METABOLIC PANEL, COMPREHENSIVE    3. Other depression  -     escitalopram oxalate (LEXAPRO) 10 mg tablet; Take 1 Tab by mouth daily.     4. Restless leg syndrome      the following changes in treatment are made: dec lexapro from 20 mg dose and trial of requip and ok to inc dose to 1 mg in 2 weeks if needed  appt in 2mo

## 2018-06-08 LAB
ALBUMIN SERPL-MCNC: 4.6 G/DL (ref 3.5–5.5)
ALBUMIN/GLOB SERPL: 2 {RATIO} (ref 1.2–2.2)
ALP SERPL-CCNC: 42 IU/L (ref 39–117)
ALT SERPL-CCNC: 20 IU/L (ref 0–32)
AST SERPL-CCNC: 18 IU/L (ref 0–40)
BILIRUB SERPL-MCNC: 0.3 MG/DL (ref 0–1.2)
BUN SERPL-MCNC: 16 MG/DL (ref 6–24)
BUN/CREAT SERPL: 21 (ref 9–23)
CALCIUM SERPL-MCNC: 9.6 MG/DL (ref 8.7–10.2)
CHLORIDE SERPL-SCNC: 93 MMOL/L (ref 96–106)
CO2 SERPL-SCNC: 25 MMOL/L (ref 18–29)
CREAT SERPL-MCNC: 0.78 MG/DL (ref 0.57–1)
FERRITIN SERPL-MCNC: 78 NG/ML (ref 15–150)
GFR SERPLBLD CREATININE-BSD FMLA CKD-EPI: 83 ML/MIN/1.73
GFR SERPLBLD CREATININE-BSD FMLA CKD-EPI: 96 ML/MIN/1.73
GLOBULIN SER CALC-MCNC: 2.3 G/DL (ref 1.5–4.5)
GLUCOSE SERPL-MCNC: 80 MG/DL (ref 65–99)
POTASSIUM SERPL-SCNC: 4.4 MMOL/L (ref 3.5–5.2)
PROT SERPL-MCNC: 6.9 G/DL (ref 6–8.5)
SODIUM SERPL-SCNC: 135 MMOL/L (ref 134–144)

## 2018-07-09 RX ORDER — MONTELUKAST SODIUM 10 MG/1
TABLET ORAL
Qty: 30 TAB | Refills: 4 | Status: SHIPPED | OUTPATIENT
Start: 2018-07-09 | End: 2019-01-21 | Stop reason: SDUPTHER

## 2018-07-17 DIAGNOSIS — N39.0 RECURRENT UTI: ICD-10-CM

## 2018-07-18 RX ORDER — SULFAMETHOXAZOLE AND TRIMETHOPRIM 400; 80 MG/1; MG/1
TABLET ORAL
Qty: 25 TAB | Refills: 1 | Status: SHIPPED | OUTPATIENT
Start: 2018-07-18 | End: 2018-09-26 | Stop reason: SDUPTHER

## 2018-08-21 RX ORDER — ALBUTEROL SULFATE 90 UG/1
AEROSOL, METERED RESPIRATORY (INHALATION)
Qty: 18 INHALER | Refills: 2 | Status: SHIPPED | OUTPATIENT
Start: 2018-08-21 | End: 2018-12-30 | Stop reason: SDUPTHER

## 2018-08-24 DIAGNOSIS — G25.81 RLS (RESTLESS LEGS SYNDROME): ICD-10-CM

## 2018-08-25 RX ORDER — ROPINIROLE 0.5 MG/1
TABLET, FILM COATED ORAL
Qty: 30 TAB | Refills: 2 | Status: SHIPPED | OUTPATIENT
Start: 2018-08-25 | End: 2018-11-26 | Stop reason: SDUPTHER

## 2018-08-25 RX ORDER — METRONIDAZOLE 7.5 MG/G
GEL VAGINAL
Qty: 140 MG | Refills: 0 | Status: SHIPPED | OUTPATIENT
Start: 2018-08-25 | End: 2019-05-23 | Stop reason: SDUPTHER

## 2018-09-04 DIAGNOSIS — N89.8 VAGINAL ITCHING: ICD-10-CM

## 2018-09-04 RX ORDER — FLUCONAZOLE 150 MG/1
TABLET ORAL
Qty: 2 TAB | Refills: 0 | Status: SHIPPED | OUTPATIENT
Start: 2018-09-04 | End: 2019-01-21 | Stop reason: ALTCHOICE

## 2018-09-26 DIAGNOSIS — N39.0 RECURRENT UTI: ICD-10-CM

## 2018-09-26 RX ORDER — SULFAMETHOXAZOLE AND TRIMETHOPRIM 400; 80 MG/1; MG/1
TABLET ORAL
Qty: 25 TAB | Refills: 1 | Status: SHIPPED | OUTPATIENT
Start: 2018-09-26

## 2018-10-18 DIAGNOSIS — F41.9 ANXIETY AND DEPRESSION: ICD-10-CM

## 2018-10-18 DIAGNOSIS — F32.A ANXIETY AND DEPRESSION: ICD-10-CM

## 2018-10-18 RX ORDER — ESCITALOPRAM OXALATE 20 MG/1
TABLET ORAL
Qty: 30 TAB | Refills: 3 | Status: SHIPPED | OUTPATIENT
Start: 2018-10-18 | End: 2019-01-21 | Stop reason: SDUPTHER

## 2018-11-26 DIAGNOSIS — G25.81 RLS (RESTLESS LEGS SYNDROME): ICD-10-CM

## 2018-11-27 RX ORDER — ROPINIROLE 0.5 MG/1
TABLET, FILM COATED ORAL
Qty: 30 TAB | Refills: 0 | Status: SHIPPED | OUTPATIENT
Start: 2018-11-27 | End: 2019-01-03 | Stop reason: SDUPTHER

## 2018-12-26 DIAGNOSIS — G25.81 RLS (RESTLESS LEGS SYNDROME): ICD-10-CM

## 2018-12-26 RX ORDER — ROPINIROLE 0.5 MG/1
TABLET, FILM COATED ORAL
Qty: 30 TAB | Refills: 0 | OUTPATIENT
Start: 2018-12-26

## 2018-12-30 RX ORDER — ALBUTEROL SULFATE 90 UG/1
AEROSOL, METERED RESPIRATORY (INHALATION)
Qty: 18 INHALER | Refills: 2 | Status: SHIPPED | OUTPATIENT
Start: 2018-12-30 | End: 2019-07-01 | Stop reason: SDUPTHER

## 2019-01-02 ENCOUNTER — TELEPHONE (OUTPATIENT)
Dept: INTERNAL MEDICINE CLINIC | Age: 59
End: 2019-01-02

## 2019-01-02 DIAGNOSIS — G25.81 RLS (RESTLESS LEGS SYNDROME): ICD-10-CM

## 2019-01-02 NOTE — TELEPHONE ENCOUNTER
Patient called to request enough medication until her appointment w/ DR. Oakley Monday, January 21, 2019 03:15 PM     Please call patient to advise.      Ropinirole 0.5 mg tablet     [phone]      [facility]/PHARMACY #[id number]- [place]

## 2019-01-03 RX ORDER — ROPINIROLE 0.5 MG/1
TABLET, FILM COATED ORAL
Qty: 20 TAB | Refills: 0 | Status: SHIPPED | OUTPATIENT
Start: 2019-01-03 | End: 2019-01-21 | Stop reason: SDUPTHER

## 2019-01-21 ENCOUNTER — OFFICE VISIT (OUTPATIENT)
Dept: INTERNAL MEDICINE CLINIC | Age: 59
End: 2019-01-21

## 2019-01-21 VITALS
RESPIRATION RATE: 16 BRPM | SYSTOLIC BLOOD PRESSURE: 133 MMHG | TEMPERATURE: 98 F | HEIGHT: 66 IN | BODY MASS INDEX: 25.39 KG/M2 | HEART RATE: 67 BPM | WEIGHT: 158 LBS | DIASTOLIC BLOOD PRESSURE: 71 MMHG | OXYGEN SATURATION: 96 %

## 2019-01-21 DIAGNOSIS — F41.9 ANXIETY AND DEPRESSION: ICD-10-CM

## 2019-01-21 DIAGNOSIS — G25.81 RLS (RESTLESS LEGS SYNDROME): ICD-10-CM

## 2019-01-21 DIAGNOSIS — G56.21 ULNAR NEUROPATHY AT ELBOW, RIGHT: Primary | ICD-10-CM

## 2019-01-21 DIAGNOSIS — L30.8 OTHER ECZEMA: ICD-10-CM

## 2019-01-21 DIAGNOSIS — F32.A ANXIETY AND DEPRESSION: ICD-10-CM

## 2019-01-21 RX ORDER — CLOBETASOL PROPIONATE 0.5 MG/G
OINTMENT TOPICAL 2 TIMES DAILY
Qty: 15 G | Refills: 0 | Status: SHIPPED | OUTPATIENT
Start: 2019-01-21

## 2019-01-21 RX ORDER — ESCITALOPRAM OXALATE 20 MG/1
TABLET ORAL
Qty: 30 TAB | Refills: 3 | Status: SHIPPED | OUTPATIENT
Start: 2019-01-21

## 2019-01-21 RX ORDER — ROPINIROLE 0.5 MG/1
TABLET, FILM COATED ORAL
Qty: 60 TAB | Refills: 5 | Status: SHIPPED | OUTPATIENT
Start: 2019-01-21 | End: 2019-07-15 | Stop reason: SDUPTHER

## 2019-01-22 NOTE — PROGRESS NOTES
HISTORY OF PRESENT ILLNESS  Nidia George is a 59 y.o. female. HPI  Della Olivo was last here in June. Issues:  1. We tried dropping Lexapro from 20 to 10 mg, but she felt dizzy and poorly so she is back on 20 a day. She would like to try again titration down. We are going to go to 20 alternating with 10 every other day. 2. Restless leg. Has responded to Requip 0.5 mg at night. Occasionally needs a second dose. Did see Dr. Jamila Mcnulty at [facility], who agreed with this medication. She would like to continue it. 3. Rash, itchy, along neck line and a little bit behind ear. Not on torso, face or hands. Over the counter hydrocortisone has not helped. No fevers. 4. Numbness in right hand ulnar distribution. Has a history of carpal tunnel repair. No weakness. Review of Systems   Constitutional: Negative for chills, fever and weight loss. Respiratory: Negative for cough, shortness of breath and wheezing. Cardiovascular: Negative for chest pain, palpitations, orthopnea, leg swelling and PND. Gastrointestinal: Negative for heartburn and nausea. Musculoskeletal: Negative for myalgias. Skin: Positive for itching and rash. Neurological: Positive for sensory change (right hand). Negative for dizziness and headaches. Psychiatric/Behavioral: Negative for depression. The patient does not have insomnia. Physical Exam   Constitutional: She is oriented to person, place, and time. She appears well-developed and well-nourished. HENT:   Head: Normocephalic and atraumatic. Neck: Normal range of motion. Neck supple. Carotid bruit is not present. No thyromegaly present. Cardiovascular: Normal rate, regular rhythm, S1 normal, S2 normal, normal heart sounds and intact distal pulses. No murmur heard. Pulmonary/Chest: Effort normal and breath sounds normal. No respiratory distress. She has no wheezes. She has no rales. Musculoskeletal: She exhibits no edema or tenderness.    Neurological: She is alert and oriented to person, place, and time. Skin: Rash noted. There is erythema. Scaly and dry at neck line   Psychiatric: She has a normal mood and affect. Her behavior is normal.   Nursing note and vitals reviewed. 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 Other eczema  -     clobetasol (TEMOVATE) 0.05 % ointment; Apply  to affected area two (2) times a day. 3. RLS (restless legs syndrome)  -     rOPINIRole (REQUIP) 0.5 mg tablet; 1-2 po qhs    4.  Anxiety and depression  -     escitalopram oxalate (LEXAPRO) 20 mg tablet; TAKE 1 TABLET  Alt with 1/2 po every other day ( 20/10 mg every other day)

## 2019-01-24 RX ORDER — MONTELUKAST SODIUM 10 MG/1
TABLET ORAL
Qty: 30 TAB | Refills: 3 | Status: SHIPPED | OUTPATIENT
Start: 2019-01-24 | End: 2019-05-20 | Stop reason: SDUPTHER

## 2019-03-01 ENCOUNTER — OFFICE VISIT (OUTPATIENT)
Dept: INTERNAL MEDICINE CLINIC | Age: 59
End: 2019-03-01

## 2019-03-01 VITALS
WEIGHT: 176 LBS | BODY MASS INDEX: 28.28 KG/M2 | HEART RATE: 62 BPM | RESPIRATION RATE: 16 BRPM | OXYGEN SATURATION: 99 % | TEMPERATURE: 98.4 F | SYSTOLIC BLOOD PRESSURE: 143 MMHG | DIASTOLIC BLOOD PRESSURE: 75 MMHG | HEIGHT: 66 IN

## 2019-03-01 DIAGNOSIS — L30.9 ECZEMA, UNSPECIFIED TYPE: ICD-10-CM

## 2019-03-01 DIAGNOSIS — R60.0 FACIAL EDEMA: ICD-10-CM

## 2019-03-01 DIAGNOSIS — J30.9 ALLERGIC RHINITIS, UNSPECIFIED SEASONALITY, UNSPECIFIED TRIGGER: Primary | ICD-10-CM

## 2019-03-01 RX ORDER — FLUTICASONE PROPIONATE 50 MCG
2 SPRAY, SUSPENSION (ML) NASAL DAILY
Qty: 1 BOTTLE | Refills: 2
Start: 2019-03-01

## 2019-03-01 RX ORDER — METHYLPREDNISOLONE 4 MG/1
TABLET ORAL
Qty: 1 DOSE PACK | Refills: 0 | Status: SHIPPED | OUTPATIENT
Start: 2019-03-01

## 2019-03-01 RX ORDER — TRIAMCINOLONE ACETONIDE 1 MG/G
CREAM TOPICAL 2 TIMES DAILY
Qty: 30 G | Refills: 1 | Status: SHIPPED | OUTPATIENT
Start: 2019-03-01

## 2019-03-01 NOTE — PATIENT INSTRUCTIONS
Allergies: Care Instructions Your Care Instructions Allergies occur when your body's defense system (immune system) overreacts to certain substances. The immune system treats a harmless substance as if it were a harmful germ or virus. Many things can cause this overreaction, including pollens, medicine, food, dust, animal dander, and mold. Allergies can be mild or severe. Mild allergies can be managed with home treatment. But medicine may be needed to prevent problems. Managing your allergies is an important part of staying healthy. Your doctor may suggest that you have allergy testing to help find out what is causing your allergies. When you know what things trigger your symptoms, you can avoid them. This can prevent allergy symptoms and other health problems. For severe allergies that cause reactions that affect your whole body (anaphylactic reactions), your doctor may prescribe a shot of epinephrine to carry with you in case you have a severe reaction. Learn how to give yourself the shot and keep it with you at all times. Make sure it is not . Follow-up care is a key part of your treatment and safety. Be sure to make and go to all appointments, and call your doctor if you are having problems. It's also a good idea to know your test results and keep a list of the medicines you take. How can you care for yourself at home? · If you have been told by your doctor that dust or dust mites are causing your allergy, decrease the dust around your bed: 
? Wash sheets, pillowcases, and other bedding in hot water every week. ? Use dust-proof covers for pillows, duvets, and mattresses. Avoid plastic covers because they tear easily and do not \"breathe. \" Wash as instructed on the label. ? Do not use any blankets and pillows that you do not need. ? Use blankets that you can wash in your washing machine. ? Consider removing drapes and carpets, which attract and hold dust, from your bedroom. · If you are allergic to house dust and mites, do not use home humidifiers. Your doctor can suggest ways you can control dust and mites. · Look for signs of cockroaches. Cockroaches cause allergic reactions. Use cockroach baits to get rid of them. Then, clean your home well. Cockroaches like areas where grocery bags, newspapers, empty bottles, or cardboard boxes are stored. Do not keep these inside your home, and keep trash and food containers sealed. Seal off any spots where cockroaches might enter your home. · If you are allergic to mold, get rid of furniture, rugs, and drapes that smell musty. Check for mold in the bathroom. · If you are allergic to outdoor pollen or mold spores, use air-conditioning. Change or clean all filters every month. Keep windows closed. · If you are allergic to pollen, stay inside when pollen counts are high. Use a vacuum  with a HEPA filter or a double-thickness filter at least two times each week. · Stay inside when air pollution is bad. Avoid paint fumes, perfumes, and other strong odors. · Avoid conditions that make your allergies worse. Stay away from smoke. Do not smoke or let anyone else smoke in your house. Do not use fireplaces or wood-burning stoves. · If you are allergic to your pets, change the air filter in your furnace every month. Use high-efficiency filters. · If you are allergic to pet dander, keep pets outside or out of your bedroom. Old carpet and cloth furniture can hold a lot of animal dander. You may need to replace them. When should you call for help? Give an epinephrine shot if: 
  · You think you are having a severe allergic reaction.  
  · You have symptoms in more than one body area, such as mild nausea and an itchy mouth.  
 After giving an epinephrine shot call 911, even if you feel better. 
 Call 911 if: 
  · You have symptoms of a severe allergic reaction. These may include: 
? Sudden raised, red areas (hives) all over your body. ? Swelling of the throat, mouth, lips, or tongue. ? Trouble breathing. ? Passing out (losing consciousness). Or you may feel very lightheaded or suddenly feel weak, confused, or restless.  
  · You have been given an epinephrine shot, even if you feel better.  
 Call your doctor now or seek immediate medical care if: 
  · You have symptoms of an allergic reaction, such as: ? A rash or hives (raised, red areas on the skin). ? Itching. ? Swelling. ? Belly pain, nausea, or vomiting.  
 Watch closely for changes in your health, and be sure to contact your doctor if: 
  · You do not get better as expected. Where can you learn more? Go to http://rosaline-jared.info/. Enter J660 in the search box to learn more about \"Allergies: Care Instructions. \" Current as of: June 27, 2018 Content Version: 11.9 © 4629-6583 [employer]. Care instructions adapted under license by [employer] (which disclaims liability or warranty for this information). If you have questions about a medical condition or this instruction, always ask your healthcare professional. [place] [address] any warranty or liability for your use of this information. Atopic Dermatitis: Care Instructions Your Care Instructions Atopic dermatitis (also called eczema) is a skin problem that causes intense itching and a red, raised rash. In severe cases, the rash develops clear fluidfilled blisters. The rash is not contagious. People with this condition seem to have very sensitive immune systems that are likely to react to things that cause allergies. The immune system is the body's way of fighting infection. There is no cure for atopic dermatitis, but you may be able to control it with care at home. Follow-up care is a key part of your treatment and safety.  Be sure to make and go to all appointments, and call your doctor if you are having problems. It's also a good idea to know your test results and keep a list of the medicines you take. How can you care for yourself at home? · Use moisturizer at least twice a day. · If your doctor prescribes a cream, use it as directed. If your doctor prescribes other medicine, take it exactly as directed. · Wash the affected area with water only. Soap can make dryness and itching worse. Pat dry. · Apply a moisturizer after bathing. Use a cream such as Lubriderm, Moisturel, or Cetaphil that does not irritate the skin or cause a rash. Apply the cream while your skin is still damp after lightly drying with a towel. · Use cold, wet cloths to reduce itching. · Keep cool, and stay out of the sun. · If itching affects your normal activities, an over-the-counter antihistamine, such as diphenhydramine (Benadryl) or loratadine (Claritin) may help. Read and follow all instructions on the label. When should you call for help? Call your doctor now or seek immediate medical care if: 
  · Your rash gets worse and you have a fever.  
  · You have new blisters or bruises, or the rash spreads and looks like a sunburn.  
  · You have signs of infection, such as: 
? Increased pain, swelling, warmth, or redness. ? Red streaks leading from the rash. ? Pus draining from the rash. ? A fever.  
  · You have crusting or oozing sores.  
  · You have joint aches or body aches along with your rash.  
 Watch closely for changes in your health, and be sure to contact your doctor if: 
  · Your rash does not clear up after 2 to 3 weeks of home treatment.  
  · Itching interferes with your sleep or daily activities. Where can you learn more? Go to http://rosaline-jared.info/. Enter C646 in the search box to learn more about \"Atopic Dermatitis: Care Instructions. \" Current as of: April 17, 2018 Content Version: 11.9 © 9337-6985 [employer].  Care instructions adapted under license by [address] (which disclaims liability or warranty for this information). If you have questions about a medical condition or this instruction, always ask your healthcare professional. [address] any warranty or liability for your use of this information.

## 2019-03-03 NOTE — PROGRESS NOTES
HISTORY OF PRESENT ILLNESS Tameka Alejandre is a 59 y.o. female. HPI Presents with complaints of intermittent edema under bilateral eyes over the past year; notes symptoms worsen when she develops worsening allergy symptoms with increased nasal congestion, sneezing and post nasal drainage. Has had allergy injections years ago. Currently taking Singulair on a daily basis; takes Allegra periodically. Has not been using any nasal steroid sprays. Complains of itchy rash around neck and behind right ear for the past several months. Has used Hydrocortisone cream, Clobetasol ointment with temporary improvement. Rash improved after using Clobetasol for 2 weeks but then returned after she stopped. Patient Active Problem List  
Diagnosis Code  Moderate intermittent asthma J45.20  Depression F32.9  Restless leg syndrome G25.81  
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  
 Ocular inflammation H57.89  Environmental allergies Z91.09  
 Blurred vision, right eye H53.8  Hx of meningioma of the brain Z86.011 Past Surgical History:  
Procedure Laterality Date [address] [facility]  HX ORTHOPAEDIC  2011  
 both thumbs  HX ORTHOPAEDIC  11/2013  
 fatty deposit removed from under left arm  HX ORTHOPAEDIC  Nov. 2014  
 2 toes- right foot Social History Socioeconomic History  Marital status:  Spouse name: Not on file  Number of children: Not on file  Years of education: Not on file  Highest education level: Not on file Social Needs  Financial resource strain: Not on file  Food insecurity - worry: Not on file  Food insecurity - inability: Not on file  Transportation needs - medical: Not on file  Transportation needs - non-medical: Not on file Occupational History  Not on file Tobacco Use  Smoking status: Never Smoker  Smokeless tobacco: Never Used Substance and Sexual Activity  Alcohol use: Yes   Alcohol/week: 0.0 oz  
 Comment: Occassionally  Drug use: No  
 Sexual activity: Yes Other Topics Concern  Not on file Social History Narrative  Not on file Family History Problem Relation Age of Onset  Cancer Mother   
     lung  Alzheimer Mother  Other Father Acromegaly  Alzheimer Maternal Grandmother  Alzheimer Maternal Aunt Current Outpatient Medications Medication Sig  
 methylPREDNISolone (MEDROL DOSEPACK) 4 mg tablet Take as directed  fluticasone (FLONASE) 50 mcg/actuation nasal spray 2 Sprays by Both Nostrils route daily.  triamcinolone acetonide (KENALOG) 0.1 % topical cream Apply  to affected area two (2) times a day. use thin layer  montelukast (SINGULAIR) 10 mg tablet TAKE 1 TABLET BY MOUTH EVERY DAY  clobetasol (TEMOVATE) 0.05 % ointment Apply  to affected area two (2) times a day.  rOPINIRole (REQUIP) 0.5 mg tablet 1-2 po qhs  
 escitalopram oxalate (LEXAPRO) 20 mg tablet TAKE 1 TABLET  Alt with 1/2 po every other day ( 20/10 mg every other day)  VENTOLIN HFA 90 mcg/actuation inhaler TAKE 1 TO 2 PUFFS EVERY 4 TO 6 HOURS AS NEEDED  
 trimethoprim-sulfamethoxazole (BACTRIM, SEPTRA)  mg per tablet TAKE 1 TABLET BY MOUTH EVERY DAY AS NEEDED  
 metroNIDAZOLE (METROGEL) 0.75 % vaginal gel INSERT 1 APPLICATOR INTO VAGINA NIGHTLY FOR 5 DAYS.  metFORMIN ER (GLUCOPHAGE XR) 500 mg tablet Take 2 Tabs by mouth daily.  phendimetrazine tartrate 35 mg tab Take 1 Tab by mouth daily as needed.  phentermine (ADIPEX-P) 37.5 mg tablet Take 1 Tab by mouth daily as needed.  thyroid, Pork, (NATURE-THROID/WESTHROID) 32.5 mg tablet Take  by mouth daily.  progesterone (PROMETRIUM) 100 mg capsule Take 100 mg by mouth daily. Progesterone/pregnenolone E4M 100/50mg  TESTOSTERONE CREAM Apply  to affected area. Testosterone Cream 1.25mg  OTHER biest (estriol/estradial 80/20%)  fexofenadine (ALLEGRA) 180 mg tablet Take 1 Tab by mouth daily. Indications: ALLERGIC CONJUNCTIVITIS (Patient taking differently: Take 180 mg by mouth daily as needed. Indications: ALLERGIC CONJUNCTIVITIS)  montelukast (SINGULAIR) 10 mg tablet TAKE 1 TABLET BY MOUTH AT BEDTIME  clonazePAM (KLONOPIN) 0.5 mg tablet TAKE 1 TABLET BY MOUTH AT BEDTIME AS NEEDED  valsartan-hydroCHLOROthiazide (DIOVAN-HCT) 80-12.5 mg per tablet TAKE 1 TAB BY MOUTH DAILY.  valACYclovir (VALTREX) 1 gram tablet Take 1 Tab by mouth as needed. Used for cold sores. No current facility-administered medications for this visit. Allergies Allergen Reactions  Pollen Extracts Sneezing Immunization History Administered Date(s) Administered  Tdap 02/18/2016 Review of Systems Constitutional: Negative for chills, fever and malaise/fatigue. HENT: Positive for congestion. Negative for ear pain, sinus pain and sore throat. Respiratory: Negative for cough and shortness of breath. Cardiovascular: Negative for chest pain and palpitations. Gastrointestinal: Negative for nausea and vomiting. Musculoskeletal: Negative for myalgias. Skin: Positive for itching and rash. Neurological: Positive for headaches. Negative for dizziness and tingling. Physical Exam  
Constitutional: She is oriented to person, place, and time. She appears well-developed and well-nourished. HENT:  
Head: Normocephalic and atraumatic. Right Ear: External ear normal.  
Left Ear: External ear normal.  
Nose: Mucosal edema and rhinorrhea present. Right sinus exhibits no maxillary sinus tenderness. Left sinus exhibits no maxillary sinus tenderness. Mouth/Throat: No posterior oropharyngeal erythema. Mild edema to undereye area without erythema, warmth or tenderness Boggy nasal turbinates Neck: Normal range of motion. Neck supple. No thyromegaly present. Cardiovascular: Normal rate and regular rhythm. Pulmonary/Chest: Effort normal and breath sounds normal.  
Lymphadenopathy: She has no cervical adenopathy. Neurological: She is alert and oriented to person, place, and time. Skin: Rash noted. Rash is maculopapular. Erythematous maculopapular rash around neck Psychiatric: She has a normal mood and affect. Her behavior is normal.  
Nursing note and vitals reviewed. ASSESSMENT and PLAN Diagnoses and all orders for this visit: 1. Allergic rhinitis, unspecified seasonality, unspecified trigger 
-     fluticasone (FLONASE) 50 mcg/actuation nasal spray; 2 Sprays by Both Nostrils route daily. 
-     REFERRAL TO ALLERGY 2. Facial edema -- no signs of infection; appears related to allergy symptoms. 3. Eczema, unspecified type 
-     methylPREDNISolone (MEDROL DOSEPACK) 4 mg tablet; Take as directed 
-     triamcinolone acetonide (KENALOG) 0.1 % topical cream; Apply  to affected area two (2) times a day. use thin layer 
 
 
lab results and schedule of future lab studies reviewed with patient 
reviewed diet, exercise and weight control 
reviewed medications and side effects in detail This note will not be viewable in [address]

## 2019-05-20 RX ORDER — MONTELUKAST SODIUM 10 MG/1
TABLET ORAL
Qty: 30 TAB | Refills: 3 | Status: SHIPPED | OUTPATIENT
Start: 2019-05-20

## 2019-05-23 DIAGNOSIS — N39.0 URINARY TRACT INFECTION WITHOUT HEMATURIA, SITE UNSPECIFIED: ICD-10-CM

## 2019-05-24 RX ORDER — PHENAZOPYRIDINE HYDROCHLORIDE 200 MG/1
TABLET, FILM COATED ORAL
Qty: 12 TAB | Refills: 1 | Status: SHIPPED | OUTPATIENT
Start: 2019-05-24

## 2019-05-24 RX ORDER — METRONIDAZOLE 7.5 MG/G
GEL VAGINAL
Qty: 140 G | Refills: 0 | Status: SHIPPED | OUTPATIENT
Start: 2019-05-24

## 2019-07-01 RX ORDER — ALBUTEROL SULFATE 90 UG/1
AEROSOL, METERED RESPIRATORY (INHALATION)
Qty: 18 INHALER | Refills: 2 | Status: SHIPPED | OUTPATIENT
Start: 2019-07-01

## 2019-07-15 DIAGNOSIS — G25.81 RLS (RESTLESS LEGS SYNDROME): ICD-10-CM

## 2019-07-15 RX ORDER — ROPINIROLE 0.5 MG/1
TABLET, FILM COATED ORAL
Qty: 60 TAB | Refills: 5 | Status: SHIPPED | OUTPATIENT
Start: 2019-07-15

## 2023-04-16 NOTE — TELEPHONE ENCOUNTER
[facility] calling to clarify new rx called in today.  Please call [phone]
Spoke with Cameron Jorgensen, pharmacist with [facility], advised he was requesting an early refill of her clonazepam, not a new RX. I advised since patient is heading out of town they can refill her meds. Pharmacist voiced understanding.
Home

## 2023-05-23 NOTE — TELEPHONE ENCOUNTER
Notified HCTZ has been d/c & changed to diovan/hctz. [FreeTextEntry1] : Pt with HTN, VENOUS INSUFFIENCY AND CAD OSTIAL LCX 20%, MILD AORTIC STENOSIS, ORTHOSTATIC HYPOTENSION IN THE PAST,  PPM 2010, Restrictive lung Disease, DD2 2022, CORY. \par STATIN MYALGIA ON PRALUENT \par \par pt with poison ivy had been on steroids and problem all summer, pt with grade 2 dd and says not sob, pt not very active for the last 2 months due ot heat. \par pt denies dizziness in june and told due to inner ear. \par \par LDL 95 ON PRALUENT \par 9/2/22: lvef 60%, dd1, severe LAE, severe calcified PMVL mild MR, E brianna: 2.1 m/s TR brianna: 2.9 m/s MILD AS. \par 8/22: > 70% LICA, 50-69% ROXANNE\par \par pt did not get CTA yet and called a couple times and not sure why did not get CTA. pt has mild AS before and murmur. pt says bp high with PMD and bp at home was 136/67 and bp now in office 130/70. \par pt did not change medications. \par \par pt had a rash to praluent shot, pt gets a cellulitis from it, pt found it is from praluent. \par LDL 95 but took last praluent dose 1 week prior. PT STOPPED PRALUENT AND RASH WENT AWAY, ITCHY. \par \par 5/26/23:\par 2/7/23: CT  ANGIO NECK: GREATER THAN 70% STENOSIS